# Patient Record
Sex: FEMALE | Race: WHITE | NOT HISPANIC OR LATINO | URBAN - METROPOLITAN AREA
[De-identification: names, ages, dates, MRNs, and addresses within clinical notes are randomized per-mention and may not be internally consistent; named-entity substitution may affect disease eponyms.]

---

## 2018-08-08 RX ORDER — NEBIVOLOL 2.5 MG/1
7.5 TABLET ORAL DAILY
Qty: 90 TABLET | Refills: 0 | Status: SHIPPED | OUTPATIENT
Start: 2018-08-08 | End: 2018-08-31

## 2018-08-08 RX ORDER — NEBIVOLOL 2.5 MG/1
3 TABLET ORAL DAILY
COMMUNITY
Start: 2017-07-12 | End: 2018-08-08 | Stop reason: SDUPTHER

## 2018-08-31 ENCOUNTER — OFFICE VISIT (OUTPATIENT)
Dept: CARDIOLOGY | Facility: CLINIC | Age: 53
End: 2018-08-31
Payer: COMMERCIAL

## 2018-08-31 VITALS
BODY MASS INDEX: 25.79 KG/M2 | HEIGHT: 61 IN | DIASTOLIC BLOOD PRESSURE: 80 MMHG | WEIGHT: 136.6 LBS | SYSTOLIC BLOOD PRESSURE: 110 MMHG | HEART RATE: 42 BPM | RESPIRATION RATE: 16 BRPM

## 2018-08-31 DIAGNOSIS — I10 ESSENTIAL HYPERTENSION: Primary | ICD-10-CM

## 2018-08-31 PROCEDURE — 99214 OFFICE O/P EST MOD 30 MIN: CPT | Performed by: INTERNAL MEDICINE

## 2018-08-31 PROCEDURE — 93000 ELECTROCARDIOGRAM COMPLETE: CPT | Performed by: INTERNAL MEDICINE

## 2018-08-31 RX ORDER — NEBIVOLOL 5 MG/1
5 TABLET ORAL DAILY
Qty: 30 TABLET | Refills: 5 | Status: SHIPPED | OUTPATIENT
Start: 2018-08-31 | End: 2019-03-26 | Stop reason: SDUPTHER

## 2018-10-16 ENCOUNTER — HOSPITAL ENCOUNTER (OUTPATIENT)
Dept: CARDIOLOGY | Facility: CLINIC | Age: 53
Discharge: HOME | End: 2018-10-16
Attending: INTERNAL MEDICINE
Payer: COMMERCIAL

## 2018-10-16 VITALS — SYSTOLIC BLOOD PRESSURE: 120 MMHG | HEART RATE: 85 BPM | DIASTOLIC BLOOD PRESSURE: 80 MMHG

## 2018-10-16 DIAGNOSIS — I10 ESSENTIAL HYPERTENSION: ICD-10-CM

## 2018-10-16 PROCEDURE — 93015 CV STRESS TEST SUPVJ I&R: CPT | Performed by: INTERNAL MEDICINE

## 2018-10-17 ENCOUNTER — TELEPHONE (OUTPATIENT)
Dept: CARDIOLOGY | Facility: CLINIC | Age: 53
End: 2018-10-17

## 2018-10-17 LAB
STRESS ANGINA INDEX: 0
STRESS BASELINE BP: NORMAL MMHG
STRESS BASELINE HR: 66 BPM
STRESS PERCENT HR: 93 %
STRESS POST ESTIMATED WORKLOAD: 10 METS
STRESS POST EXERCISE DUR MIN: 7 MIN
STRESS POST PEAK BP: NORMAL MMHG
STRESS POST PEAK HR: 156 BPM
STRESS TARGET HR: 142 BPM

## 2018-10-22 NOTE — TELEPHONE ENCOUNTER
I called and spoke with Tammy, she verbalized understanding her stress ekg was normal. She thanks you for the update!

## 2018-12-06 ENCOUNTER — DOCUMENTATION (OUTPATIENT)
Dept: CARDIOLOGY | Facility: CLINIC | Age: 53
End: 2018-12-06

## 2019-03-26 DIAGNOSIS — I10 ESSENTIAL HYPERTENSION: ICD-10-CM

## 2019-03-26 RX ORDER — NEBIVOLOL 5 MG/1
5 TABLET ORAL DAILY
Qty: 30 TABLET | Refills: 5 | Status: SHIPPED | OUTPATIENT
Start: 2019-03-26 | End: 2019-09-13 | Stop reason: SDUPTHER

## 2019-08-02 ENCOUNTER — TELEPHONE (OUTPATIENT)
Dept: SCHEDULING | Facility: CLINIC | Age: 54
End: 2019-08-02

## 2019-08-02 NOTE — TELEPHONE ENCOUNTER
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 Bystolic 5mg  bottle was lost. Do you have any samples to hold her until 8/26. This is when she can get it refilled. Please call patient at [phone] to advise if there are samples at [facility]. She does NOT have pills for tonight. ty

## 2019-08-26 ENCOUNTER — DOCUMENTATION (OUTPATIENT)
Dept: CARDIOLOGY | Facility: CLINIC | Age: 54
End: 2019-08-26

## 2019-09-11 ENCOUNTER — TELEPHONE (OUTPATIENT)
Dept: SCHEDULING | Facility: CLINIC | Age: 54
End: 2019-09-11

## 2019-09-11 NOTE — TELEPHONE ENCOUNTER
Pt states she was unable to make todays appt at 9:40 am, she was unable to make it on time.  Pt will rescheduled later, once she figures out her schedule.  Pt can be reached at [phone].

## 2019-09-13 ENCOUNTER — OFFICE VISIT (OUTPATIENT)
Dept: CARDIOLOGY | Facility: CLINIC | Age: 54
End: 2019-09-13
Payer: COMMERCIAL

## 2019-09-13 VITALS
DIASTOLIC BLOOD PRESSURE: 64 MMHG | BODY MASS INDEX: 27 KG/M2 | HEART RATE: 68 BPM | SYSTOLIC BLOOD PRESSURE: 112 MMHG | RESPIRATION RATE: 16 BRPM | WEIGHT: 143 LBS | HEIGHT: 61 IN

## 2019-09-13 DIAGNOSIS — I10 ESSENTIAL HYPERTENSION: Primary | ICD-10-CM

## 2019-09-13 PROCEDURE — 99213 OFFICE O/P EST LOW 20 MIN: CPT | Performed by: INTERNAL MEDICINE

## 2019-09-13 PROCEDURE — 93000 ELECTROCARDIOGRAM COMPLETE: CPT | Performed by: INTERNAL MEDICINE

## 2019-09-13 RX ORDER — NEBIVOLOL 5 MG/1
5 TABLET ORAL DAILY
Qty: 90 TABLET | Refills: 5 | Status: SHIPPED | OUTPATIENT
Start: 2019-09-13 | End: 2020-08-10 | Stop reason: SDUPTHER

## 2019-09-13 NOTE — ASSESSMENT & PLAN NOTE
Continue with nebivolol.  Prescription sent into her pharmacy.  Patient does not require any other cardiac testing at this time.

## 2019-09-13 NOTE — PROGRESS NOTES
Cardiology Outpatient Note    [facility] HEART [facility] Office  [address] [place] [address]     [facility]  The [facility] [address]  [address] [place] [address]     TEL  [phone]  [facility]     Tammy Walker is a 53 y.o. female with history of hypertension and tobacco use.  She says she smokes about 10 cigarettes daily.  She is on nebivolol 5 mg daily and says that she feels much better when she takes the medication.  She is very upset that she has gained weight and is concerned it is from the nebivolol.  She also blames the medication for her hair thinning.  When pressed further, she also admits to eating 1 pound of spaghetti yesterday and to not exercising.  Then she admits that those also may be contributing to the difficulty losing weight.  She has been on other medications in the past but did not tolerate them or feel well on them.  She denies any recent hospitalizations illnesses or procedures.  She denies any classic anginal chest pain, palpitations, syncope, or shortness of breath.  She works at the [employer].                                                         [facility]     Medical History:  Past Medical History:   Diagnosis Date   • Hypertension    • Tobacco abuse        Surgical History:  Past Surgical History:   Procedure Laterality Date   • BREAST RECONSTRUCTION  ,    implant   •  SECTION         Social History: reports that she has been smoking.  She has been smoking about 0.50 packs per day. She has never used smokeless tobacco. She reports that she drinks alcohol. Drug use questions deferred to the physician.    Family History: indicated that her biological mother is .       Allergies:No known allergies    Current Medications:    Outpatient Encounter Prescriptions as of 2019:   •  multivitamin with minerals (HAIR,SKIN AND NAILS) tablet, Take 1 tablet by mouth daily.  •  nebivolol  "(BYSTOLIC) 5 mg tablet, Take 1 tablet (5 mg total) by mouth daily.                                                          OBJECTIVE   ROS as in HPI   Constitution: Negative for chills and fever.   Eyes: Negative for blurred vision and visual disturbance.   Cardiovascular: Negative for chest pain, dyspnea on exertion, near-syncope, palpitations and syncope.   Respiratory: Negative for hemoptysis, shortness of breath and wheezing.    Hematologic/Lymphatic: Negative for bleeding problem.   Skin: Negative for rash. No new skin changes  Gastrointestinal: Negative for abdominal pain,  melena, nausea and vomiting.   Genitourinary: Negative for dysuria and hematuria.   Neurological: Negative for headaches.      Vitals:    09/13/19 1134   BP: 112/64   BP Location: Left upper arm   Patient Position: Sitting   Pulse: 68   Resp: 16   Weight: 64.9 kg (143 lb)   Height: 1.549 m (5' 1\")       BP Readings from Last 3 Encounters:   09/13/19 112/64   10/16/18 120/80   08/31/18 110/80     Wt Readings from Last 3 Encounters:   09/13/19 64.9 kg (143 lb)   08/31/18 62 kg (136 lb 9.6 oz)        Physical Exam   Constitutional: Appears comfortable in no distress  HEENT:  Neck Supple.  No JVD No carotid bruits no cervical lymphadenopathy  Head: Normocephalic.   Eyes: Extraocular movements intact  Cardiovascular: Normal rate, regular rhythm and normal heart sounds.  Exam reveals no friction rub.    Pulmonary/Chest: Effort normal and breath sounds normal. No wheezes.  Abdominal: Soft and nontender.   Musculoskeletal: No lower extremity edema.   Neurological: Alert and oriented to person, place, and time.   Skin: Skin is warm and dry.   Psychiatric: Is at her baseline-may be manic   Objective   No results found for: CHOL  No results found for: HDL  No results found for: LDLCALC  No results found for: TRIG  No results found for: ALT, AST  No results found for: WBC, HGB, HCT, PLT, INR  No results found for: GLUCOSE, NA, K, CO2, CL, BUN, " CREATININE  No results found for: HGBA1C  No results found for: TSH  No results found for: BNP    Troponin I Results     No lab values to display.                                                             IMAGING       Exercise treadmill stress test 10-16-18  Normal stress EKG.  No significant arrhythmias or ischemic changes.  Normal blood pressure response.          EKG 09/13/19  Sinus  Rhythm 68bpm QTC 382ms  WITHIN NORMAL LIMITS                                             ASSESSMENT AND PLAN   Ms. Estes is a 53-year-old female with a history of hypertension.  She continues to smoke 8-10 cigarettes daily.  Her issues include:        Assessment/Plan    No problem-specific Assessment & Plan notes found for this encounter.              No Follow-up on file.   Thank you for allowing me to participate in the care of this patient.  I hope this information is helpful.         Elen Diaz MD [facility]  9/13/2019  12:15 PM    This document was generated utilizing voice recognition technology. A reasonable attempt at proofreading has been made to minimize errors but please excuse any typographical errors which may be present. Please call with any questions.

## 2019-09-13 NOTE — LETTER
2019     Artemio Julio MD  [address]  [address]  [place] [address]    Patient: Tammy Walker  YOB: 1965  Date of Visit: 2019      Dear Dr. Julio:    Thank you for referring Tammy Walker to me for evaluation. Below are my notes for this consultation.    If you have questions, please do not hesitate to call me. I look forward to following your patient along with you.         Sincerely,        Elen Diaz MD        CC: No Recipients  Elen Diaz MD  2019  3:50 PM  Signed       Cardiology Outpatient Note    [facility] Office  [address] [place] [address]     [facility]  The [facility] Level  [address] [place] [address]     TEL  [phone]  [facility]/mlhc     Tammy Walker is a 53 y.o. female with history of hypertension and tobacco use.  She says she smokes about 10 cigarettes daily.  She is on nebivolol 5 mg daily and says that she feels much better when she takes the medication.  She is very upset that she has gained weight and is concerned it is from the nebivolol.  She also blames the medication for her hair thinning.  When pressed further, she also admits to eating 1 pound of spaghetti yesterday and to not exercising.  Then she admits that those also may be contributing to the difficulty losing weight.  She has been on other medications in the past but did not tolerate them or feel well on them.  She denies any recent hospitalizations illnesses or procedures.  She denies any classic anginal chest pain, palpitations, syncope, or shortness of breath.  She works at the post office.                                                         [facility]     Medical History:  Past Medical History:   Diagnosis Date   • Hypertension    • Tobacco abuse        Surgical History:  Past Surgical History:   Procedure Laterality Date   • BREAST RECONSTRUCTION  ,    implant   •  SECTION   "       Social History: reports that she has been smoking.  She has been smoking about 0.50 packs per day. She has never used smokeless tobacco. She reports that she drinks alcohol. Drug use questions deferred to the physician.    Family History: indicated that her biological mother is .       Allergies:No known allergies    Current Medications:    Outpatient Encounter Prescriptions as of 2019:   •  multivitamin with minerals (HAIR,SKIN AND NAILS) tablet, Take 1 tablet by mouth daily.  •  nebivolol (BYSTOLIC) 5 mg tablet, Take 1 tablet (5 mg total) by mouth daily.                                                          OBJECTIVE   ROS as in HPI   Constitution: Negative for chills and fever.   Eyes: Negative for blurred vision and visual disturbance.   Cardiovascular: Negative for chest pain, dyspnea on exertion, near-syncope, palpitations and syncope.   Respiratory: Negative for hemoptysis, shortness of breath and wheezing.    Hematologic/Lymphatic: Negative for bleeding problem.   Skin: Negative for rash. No new skin changes  Gastrointestinal: Negative for abdominal pain,  melena, nausea and vomiting.   Genitourinary: Negative for dysuria and hematuria.   Neurological: Negative for headaches.      Vitals:    19 1134   BP: 112/64   BP Location: Left upper arm   Patient Position: Sitting   Pulse: 68   Resp: 16   Weight: 64.9 kg (143 lb)   Height: 1.549 m (5' 1\")       BP Readings from Last 3 Encounters:   19 112/64   10/16/18 120/80   18 110/80     Wt Readings from Last 3 Encounters:   19 64.9 kg (143 lb)   18 62 kg (136 lb 9.6 oz)        Physical Exam   Constitutional: Appears comfortable in no distress  HEENT:  Neck Supple.  No JVD No carotid bruits no cervical lymphadenopathy  Head: Normocephalic.   Eyes: Extraocular movements intact  Cardiovascular: Normal rate, regular rhythm and normal heart sounds.  Exam reveals no friction rub.    Pulmonary/Chest: Effort normal and " breath sounds normal. No wheezes.  Abdominal: Soft and nontender.   Musculoskeletal: No lower extremity edema.   Neurological: Alert and oriented to person, place, and time.   Skin: Skin is warm and dry.   Psychiatric: Is at her baseline-may be manic   Objective   No results found for: CHOL  No results found for: HDL  No results found for: LDLCALC  No results found for: TRIG  No results found for: ALT, AST  No results found for: WBC, HGB, HCT, PLT, INR  No results found for: GLUCOSE, NA, K, CO2, CL, BUN, CREATININE  No results found for: HGBA1C  No results found for: TSH  No results found for: BNP    Troponin I Results     No lab values to display.                                                             IMAGING       Exercise treadmill stress test 10-16-18  Normal stress EKG.  No significant arrhythmias or ischemic changes.  Normal blood pressure response.          EKG 09/13/19  Sinus  Rhythm 68bpm QTC 382ms  WITHIN NORMAL LIMITS                                             ASSESSMENT AND PLAN   Ms. Estes is a 53-year-old female with a history of hypertension.  She continues to smoke 8-10 cigarettes daily.  Her issues include:        Assessment/Plan    No problem-specific Assessment & Plan notes found for this encounter.              No Follow-up on file.   Thank you for allowing me to participate in the care of this patient.  I hope this information is helpful.         Elen Diaz MD [facility]  9/13/2019  12:15 PM    This document was generated utilizing voice recognition technology. A reasonable attempt at proofreading has been made to minimize errors but please excuse any typographical errors which may be present. Please call with any questions.

## 2020-02-10 ENCOUNTER — TELEPHONE (OUTPATIENT)
Dept: SCHEDULING | Facility: CLINIC | Age: 55
End: 2020-02-10

## 2020-02-10 NOTE — TELEPHONE ENCOUNTER
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 She is at work and does not have her bystolic 5mg medication with her at work. Do you have samples at [facility]? Please call patient at [phone]

## 2020-08-10 DIAGNOSIS — I10 ESSENTIAL HYPERTENSION: ICD-10-CM

## 2020-08-10 RX ORDER — NEBIVOLOL 5 MG/1
5 TABLET ORAL DAILY
Qty: 30 TABLET | Refills: 0 | Status: SHIPPED | OUTPATIENT
Start: 2020-08-10 | End: 2020-12-08

## 2020-09-11 ENCOUNTER — OFFICE VISIT (OUTPATIENT)
Dept: CARDIOLOGY | Facility: CLINIC | Age: 55
End: 2020-09-11
Payer: COMMERCIAL

## 2020-09-11 VITALS
SYSTOLIC BLOOD PRESSURE: 116 MMHG | HEART RATE: 76 BPM | BODY MASS INDEX: 23.03 KG/M2 | DIASTOLIC BLOOD PRESSURE: 80 MMHG | HEIGHT: 61 IN | RESPIRATION RATE: 16 BRPM | WEIGHT: 122 LBS

## 2020-09-11 DIAGNOSIS — I10 ESSENTIAL HYPERTENSION: Primary | ICD-10-CM

## 2020-09-11 PROCEDURE — 93000 ELECTROCARDIOGRAM COMPLETE: CPT | Performed by: INTERNAL MEDICINE

## 2020-09-11 PROCEDURE — 99213 OFFICE O/P EST LOW 20 MIN: CPT | Performed by: INTERNAL MEDICINE

## 2020-09-11 NOTE — LETTER
2020     Artemio Julio MD  [address]  [address]  [facility] [place] [facility] [address]    Patient: Tammy Walker  YOB: 1965  Date of Visit: 2020      Dear Dr. Julio:    Thank you for referring Tammy Walker to me for evaluation. Below are my notes for this consultation.    If you have questions, please do not hesitate to call me. I look forward to following your patient along with you.         Sincerely,        Elen Diaz MD        CC: No Recipients  Elen Diaz MD  2020  4:14 PM  Signed       Cardiology Outpatient Note    [facility] Office  [address] [place] [address]     [facility]  The Heart [facility] Level  [address] [place] [address]     TEL  [phone]  [facility]/mlhc     Tammy Walker is a 54 y.o. female with history of hypertension and tobacco use.  She occasionally smokes cigarettes and attributes much of this to stress at her work.  She works at the [employer].    In the last few months she has developed some tooth pain and dental aches.  She was given antibiotics and the pain improved.  She says she was told that she was grinding her teeth and may require some additional dental work.  However in this process she was eating less and has lost about 20 pounds since her last visit.    She denies any chest pain, palpitations, syncope, or shortness of breath.  She denies any major illnesses since her last visit.  She mostly is concerned about the stress at her work.                                                         [facility]     Medical History:  Past Medical History:   Diagnosis Date   • Hypertension    • Tobacco abuse        Surgical History:  Past Surgical History:   Procedure Laterality Date   • BREAST RECONSTRUCTION  ,    implant   •  SECTION         Social History: reports that she has been smoking. She has been smoking about 0.50 packs per day. She has never used  "smokeless tobacco. She reports that she drinks alcohol. Drug use questions deferred to the physician.    Family History: She indicated that her biological mother is .      Allergies:No known allergies    Current Medications:    Outpatient Encounter Medications as of 2020:   •  multivitamin tablet, Take 1 tablet by mouth daily.  •  nebivoloL (BYSTOLIC) 5 mg tablet, Take 1 tablet (5 mg total) by mouth daily.  •  multivitamin with minerals (HAIR,SKIN AND NAILS) tablet, Take 1 tablet by mouth daily.                                                          OBJECTIVE   ROS as in HPI   Constitution: Negative for chills and fever.   Eyes: Negative for blurred vision and visual disturbance.   Cardiovascular: Negative for chest pain, dyspnea on exertion, near-syncope, palpitations and syncope.   Respiratory: Negative for hemoptysis, shortness of breath and wheezing.    Hematologic/Lymphatic: Negative for bleeding problem.   Skin: Negative for rash. No new skin changes  Gastrointestinal: Negative for abdominal pain, diarrhea, hematochezia, melena, nausea and vomiting.   Genitourinary: Negative for dysuria and hematuria.   Neurological: Negative for headaches.   Positive stress         Vitals:    20 1456   BP: 116/80   BP Location: Left upper arm   Patient Position: Sitting   Pulse: 76   Resp: 16   Weight: 55.3 kg (122 lb)   Height: 1.549 m (5' 1\")       BP Readings from Last 3 Encounters:   20 116/80   19 112/64   10/16/18 120/80     Wt Readings from Last 3 Encounters:   20 55.3 kg (122 lb)   19 64.9 kg (143 lb)   18 62 kg (136 lb 9.6 oz)       Physical Exam   Constitutional: Appears comfortable in no distress  HEENT:  Neck Supple.  No JVD No carotid bruits no cervical lymphadenopathy  Head: Normocephalic.   Eyes: Extraocular movements intact  Cardiovascular: Normal rate, regular rhythm and normal heart sounds.  Exam reveals no friction rub.    Pulmonary/Chest: Effort normal and " breath sounds normal. No wheezes.  Abdominal: Soft and nontender.  Musculoskeletal: No lower extremity edema.   Neurological: Alert and oriented to person, place, and time.   Skin: Skin is warm and dry.   Psychiatric: Behavior is normal.            Objective   No results found for: CHOL  No results found for: HDL  No results found for: LDLCALC  No results found for: TRIG  No results found for: ALT, AST  No results found for: WBC, HGB, HCT, PLT, INR  No results found for: GLUCOSE, NA, K, CO2, CL, BUN, CREATININE  No results found for: HGBA1C  No results found for: TSH  No results found for: BNP  [unfilled]    Troponin I Results     No lab values to display.                                                             IMAGING         Exercise treadmill stress test 10-16-18  Normal stress EKG.  No significant arrhythmias or ischemic changes.  Normal blood pressure response.             EKG 9/11/2020   Sinus  Rhythm 76bpm QTC 400ms                                           ASSESSMENT AND PLAN     Ms. Estes is a 54-year-old woman with the following issues:  Assessment/Plan    Essential hypertension  Continue with nebivolol 5 mg daily.  Her blood pressures currently controlled.  If the patient loses more weight or finds her blood pressure low she checks it herself, she has been asked to call our office to adjust her dose of medication.              Return in about 1 year (around 9/11/2021).       Thank you for allowing me to participate in the care of this patient.  I hope this information is helpful.         Elen Diaz MD [facility]  9/11/2020  4:14 PM    This document was generated utilizing voice recognition technology. A reasonable attempt at proofreading has been made to minimize errors but please excuse any typographical errors which may be present. Please call with any questions.

## 2020-09-11 NOTE — ASSESSMENT & PLAN NOTE
Continue with nebivolol 5 mg daily.  Her blood pressures currently controlled.  If the patient loses more weight or finds her blood pressure low she checks it herself, she has been asked to call our office to adjust her dose of medication.

## 2020-09-11 NOTE — PROGRESS NOTES
Cardiology Outpatient Note    [facility] HEART GROUP    [facility] Office  [address] [place] [address]     [facility]  [facility] [address]  [address] [place] [address]     TEL  [phone]  [facility]/mlhc     Tammy Walker is a 54 y.o. female with history of hypertension and tobacco use.  She occasionally smokes cigarettes and attributes much of this to stress at her work.  She works at the [employer] office.    In the last few months she has developed some tooth pain and dental aches.  She was given antibiotics and the pain improved.  She says she was told that she was grinding her teeth and may require some additional dental work.  However in this process she was eating less and has lost about 20 pounds since her last visit.    She denies any chest pain, palpitations, syncope, or shortness of breath.  She denies any major illnesses since her last visit.  She mostly is concerned about the stress at her work.                                                         [facility]     Medical History:  Past Medical History:   Diagnosis Date   • Hypertension    • Tobacco abuse        Surgical History:  Past Surgical History:   Procedure Laterality Date   • BREAST RECONSTRUCTION  ,    implant   •  SECTION         Social History: reports that she has been smoking. She has been smoking about 0.50 packs per day. She has never used smokeless tobacco. She reports that she drinks alcohol. Drug use questions deferred to the physician.    Family History: She indicated that her biological mother is .      Allergies:No known allergies    Current Medications:    Outpatient Encounter Medications as of 2020:   •  multivitamin tablet, Take 1 tablet by mouth daily.  •  nebivoloL (BYSTOLIC) 5 mg tablet, Take 1 tablet (5 mg total) by mouth daily.  •  multivitamin with minerals (HAIR,SKIN AND NAILS) tablet, Take 1 tablet by mouth daily.                   "                                        OBJECTIVE   ROS as in HPI   Constitution: Negative for chills and fever.   Eyes: Negative for blurred vision and visual disturbance.   Cardiovascular: Negative for chest pain, dyspnea on exertion, near-syncope, palpitations and syncope.   Respiratory: Negative for hemoptysis, shortness of breath and wheezing.    Hematologic/Lymphatic: Negative for bleeding problem.   Skin: Negative for rash. No new skin changes  Gastrointestinal: Negative for abdominal pain, diarrhea, hematochezia, melena, nausea and vomiting.   Genitourinary: Negative for dysuria and hematuria.   Neurological: Negative for headaches.   Positive stress         Vitals:    09/11/20 1456   BP: 116/80   BP Location: Left upper arm   Patient Position: Sitting   Pulse: 76   Resp: 16   Weight: 55.3 kg (122 lb)   Height: 1.549 m (5' 1\")       BP Readings from Last 3 Encounters:   09/11/20 116/80   09/13/19 112/64   10/16/18 120/80     Wt Readings from Last 3 Encounters:   09/11/20 55.3 kg (122 lb)   09/13/19 64.9 kg (143 lb)   08/31/18 62 kg (136 lb 9.6 oz)       Physical Exam   Constitutional: Appears comfortable in no distress  HEENT:  Neck Supple.  No JVD No carotid bruits no cervical lymphadenopathy  Head: Normocephalic.   Eyes: Extraocular movements intact  Cardiovascular: Normal rate, regular rhythm and normal heart sounds.  Exam reveals no friction rub.    Pulmonary/Chest: Effort normal and breath sounds normal. No wheezes.  Abdominal: Soft and nontender.  Musculoskeletal: No lower extremity edema.   Neurological: Alert and oriented to person, place, and time.   Skin: Skin is warm and dry.   Psychiatric: Behavior is normal.            Objective   No results found for: CHOL  No results found for: HDL  No results found for: LDLCALC  No results found for: TRIG  No results found for: ALT, AST  No results found for: WBC, HGB, HCT, PLT, INR  No results found for: GLUCOSE, NA, K, CO2, CL, BUN, CREATININE  No results " found for: HGBA1C  No results found for: TSH  No results found for: BNP  [unfilled]    Troponin I Results     No lab values to display.                                                             IMAGING         Exercise treadmill stress test 10-16-18  Normal stress EKG.  No significant arrhythmias or ischemic changes.  Normal blood pressure response.             EKG 9/11/2020   Sinus  Rhythm 76bpm QTC 400ms                                           ASSESSMENT AND PLAN     Ms. Estes is a 54-year-old woman with the following issues:  Assessment/Plan    Essential hypertension  Continue with nebivolol 5 mg daily.  Her blood pressures currently controlled.  If the patient loses more weight or finds her blood pressure low she checks it herself, she has been asked to call our office to adjust her dose of medication.              Return in about 1 year (around 9/11/2021).       Thank you for allowing me to participate in the care of this patient.  I hope this information is helpful.         Elen Diaz MD [facility]  9/11/2020  4:14 PM    This document was generated utilizing voice recognition technology. A reasonable attempt at proofreading has been made to minimize errors but please excuse any typographical errors which may be present. Please call with any questions.

## 2020-12-11 ENCOUNTER — TELEPHONE (OUTPATIENT)
Dept: SCHEDULING | Facility: CLINIC | Age: 55
End: 2020-12-11

## 2020-12-11 NOTE — TELEPHONE ENCOUNTER
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 Please call her at [phone] to advise that she can pick one up today at [facility] TODAY.

## 2021-10-11 ENCOUNTER — TELEPHONE (OUTPATIENT)
Dept: SCHEDULING | Facility: CLINIC | Age: 56
End: 2021-10-11

## 2021-10-11 NOTE — TELEPHONE ENCOUNTER
Pt calling to schedule a follow up appt with Dr Diaz.  No appts avail.  Pt can be reached at [phone].

## 2021-10-27 DIAGNOSIS — I10 ESSENTIAL HYPERTENSION: ICD-10-CM

## 2021-10-27 RX ORDER — NEBIVOLOL 5 MG/1
5 TABLET ORAL
Qty: 90 TABLET | Refills: 1 | Status: SHIPPED | OUTPATIENT
Start: 2021-10-27 | End: 2022-05-16

## 2021-10-29 RX ORDER — NEBIVOLOL 5 MG/1
5 TABLET ORAL
Qty: 90 TABLET | Refills: 1 | Status: CANCELLED | OUTPATIENT
Start: 2021-10-29

## 2021-10-29 NOTE — TELEPHONE ENCOUNTER
Medicine Refill Request    Last Office Visit: 9/11/2020  Last Telemedicine Visit: Visit date not found    Next Office Visit: 11/3/2021  Next Telemedicine Visit: Visit date not found     Pt calling to request medication refill.   Pt states she uses the Discount Coupon Card for [facility] for 90 Day Supply. TY     Current Outpatient Medications:   •  multivitamin tablet, Take 1 tablet by mouth daily., Disp: , Rfl:   •  multivitamin with minerals (HAIR,SKIN AND NAILS) tablet, Take 1 tablet by mouth daily., Disp: , Rfl:   •  nebivoloL (BYSTOLIC) 5 mg tablet, Take 1 tablet (5 mg total) by mouth once daily., Disp: 90 tablet, Rfl: 1      BP Readings from Last 3 Encounters:   09/11/20 116/80   09/13/19 112/64   10/16/18 120/80       Recent Lab results:  No results found for: CHOL, No results found for: HDL, No results found for: LDLCALC, No results found for: TRIG     No results found for: GLUCOSE, No results found for: HGBA1C      No results found for: CREATININE    No results found for: TSH

## 2021-11-03 ENCOUNTER — OFFICE VISIT (OUTPATIENT)
Dept: CARDIOLOGY | Facility: CLINIC | Age: 56
End: 2021-11-03
Payer: COMMERCIAL

## 2021-11-03 VITALS
BODY MASS INDEX: 25.79 KG/M2 | SYSTOLIC BLOOD PRESSURE: 122 MMHG | RESPIRATION RATE: 16 BRPM | DIASTOLIC BLOOD PRESSURE: 84 MMHG | HEIGHT: 61 IN | HEART RATE: 61 BPM | WEIGHT: 136.6 LBS

## 2021-11-03 DIAGNOSIS — I10 ESSENTIAL HYPERTENSION: Primary | ICD-10-CM

## 2021-11-03 PROBLEM — E78.00 HYPERCHOLESTEROLEMIA: Status: ACTIVE | Noted: 2021-11-03

## 2021-11-03 PROCEDURE — 93000 ELECTROCARDIOGRAM COMPLETE: CPT | Performed by: INTERNAL MEDICINE

## 2021-11-03 PROCEDURE — 99214 OFFICE O/P EST MOD 30 MIN: CPT | Performed by: INTERNAL MEDICINE

## 2021-11-03 PROCEDURE — 3008F BODY MASS INDEX DOCD: CPT | Performed by: INTERNAL MEDICINE

## 2021-11-03 RX ORDER — ATORVASTATIN CALCIUM 10 MG/1
1 TABLET, FILM COATED ORAL DAILY
COMMUNITY
Start: 2021-10-22 | End: 2022-01-12

## 2021-11-03 NOTE — LETTER
November 3, 2021     Artemio Julio MD  [address]  [address]  [facility] [place] [facility] [address]    Patient: Tammy Walker  YOB: 1965  Date of Visit: 11/3/2021      Dear Dr. Julio:    Thank you for referring Tammy Walker to me for evaluation. Below are my notes for this consultation.    If you have questions, please do not hesitate to call me. I look forward to following your patient along with you.         Sincerely,        Elen Diaz MD        CC: No Recipients  Elen Diaz MD  11/3/2021  9:46 AM  Signed       Cardiology Outpatient Note    [facility] HEART [facility] Office  [address] [place] [address]     [facility]  The [facility] Level  [address] [place] [address]     TEL  [phone]  [facility]     Tammy Walker is a 55 y.o. female with history of hypertension and tobacco use.  She occasionally smokes cigarettes and attributes much of this to stress at her work.  She works at the [employer].    She had a recent mammogram which was unremarkable.  She has routine gynecologic exam scheduled.  She had blood work 9-2021 which was notable for an LDL of 164 mg/dL which is higher than her normal values.  Her triglycerides were 181 and liver function tests were normal.  In that setting her primary care physician started her on atorvastatin 10 mg daily.  Blood work 10- showed  ALT 47 with  and triglycerides 285.   On blood work 10-19-21 patient's liver function tests were normal   CBC, TSH, and creatinine were normal.    She denies any chest pain, palpitations, syncope, or shortness of breath.  She denies any major illnesses since her last visit.  She mostly is concerned about the stress at her work and her blood work.    She denies any acute illnesses or hospitalizations.  She continues to smoke.  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                                    "[facility]     Medical History:  Past Medical History:   Diagnosis Date   • Hypertension    • Tobacco abuse        Surgical History:  Past Surgical History:   Procedure Laterality Date   • BREAST RECONSTRUCTION  ,    implant   •  SECTION         Social History: reports that she has been smoking. She has been smoking about 0.50 packs per day. She has never used smokeless tobacco. She reports current alcohol use. Drug use questions deferred to the physician.    Family History: She indicated that her biological mother is .      Allergies:No known allergies    Current Medications:    Outpatient Encounter Medications as of 11/3/2021:   •  atorvastatin 10 mg tablet, Take 1 tablet by mouth daily.  •  multivitamin tablet, Take 1 tablet by mouth daily.  •  multivitamin with minerals (HAIR,SKIN AND NAILS) tablet, Take 1 tablet by mouth daily.  •  nebivoloL (BYSTOLIC) 5 mg tablet, Take 1 tablet (5 mg total) by mouth once daily.  •  [DISCONTINUED] BYSTOLIC 5 mg tablet, TAKE ONE TABLET BY MOUTH EVERY DAY                                                          OBJECTIVE   ROS as in HPI   Constitution: Negative for chills and fever.   Eyes: Negative for blurred vision and visual disturbance.   Cardiovascular: Negative for chest pain, dyspnea on exertion, near-syncope, palpitations and syncope.   Respiratory: Negative for hemoptysis, shortness of breath and wheezing.    Hematologic/Lymphatic: Negative for bleeding problem.   Skin: Negative for rash. No new skin changes  Gastrointestinal: Negative for abdominal pain, diarrhea, hematochezia, melena, nausea and vomiting.   Genitourinary: Negative for dysuria and hematuria.   Neurological: Negative for headaches.   Positive stress         Vitals:    21 0909   BP: 122/84   BP Location: Left upper arm   Patient Position: Sitting   Pulse: 61   Resp: 16   Weight: 62 kg (136 lb 9.6 oz)   Height: 1.549 m (5' 1\")       BP Readings from Last 3 " Encounters:   11/03/21 122/84   09/11/20 116/80   09/13/19 112/64     Wt Readings from Last 3 Encounters:   11/03/21 62 kg (136 lb 9.6 oz)   09/11/20 55.3 kg (122 lb)   09/13/19 64.9 kg (143 lb)       Physical Exam   Constitutional: Appears comfortable in no distress  HEENT:  Neck Supple.  No JVD No carotid bruits no cervical lymphadenopathy  Head: Normocephalic.   Eyes: Extraocular movements intact  Cardiovascular: Normal rate, regular rhythm and normal heart sounds.  Exam reveals no friction rub.    Pulmonary/Chest: Effort normal and breath sounds normal. No wheezes.  Abdominal: Soft and nontender.  Musculoskeletal: No lower extremity edema.   Neurological: Alert and oriented to person, place, and time.   Skin: Skin is warm and dry.   Psychiatric: Behavior is normal.            Objective   No results found for: CHOL  No results found for: HDL  No results found for: LDLCALC  No results found for: TRIG  No results found for: ALT, AST  No results found for: WBC, HGB, HCT, PLT, INR  No results found for: GLUCOSE, NA, K, CO2, CL, BUN, CREATININE  No results found for: HGBA1C  No results found for: TSH  No results found for: BNP  [unfilled]    Troponin I Results     No lab values to display.                                                             IMAGING     Exercise treadmill stress test 10-16-18  Normal stress EKG.  No significant arrhythmias or ischemic changes.  Normal blood pressure response.     EKG 11/3/2021   Sinus  Rhythm 61bpm [id number]  WITHIN NORMAL LIMITS                                             ASSESSMENT AND PLAN     Ms. Estes is a 55-year-old woman with the following issues:  Assessment/Plan    Essential hypertension  Nebivolol 5 mg daily    Hypercholesterolemia  On atorvastatin 10 mg daily.  She has taken it for a week and is tolerating the medication well.  The liver function tests initially reported 10-11-21 which then normalized on blood work 10-19-21 without any intervention may be an  aberration.  Calculated 10-year risk of cardiovascular disease per ACC/AHA guidelines is borderline at 7.3% even with elevated LDL.  However her triglyceride level is elevated and she is a smoker.  She was recommended to continue with the medication.  She will return in approximately 2 to 3 months and have an echocardiogram at that time along with repeat blood work to check her lipid profile liver function test.              Return in about 3 months (around 2/3/2022).       Thank you for allowing me to participate in the care of this patient.  I hope this information is helpful.         Elen Diaz MD [facility] VLADISLAV  11/3/2021  9:46 AM    This document was generated utilizing voice recognition technology. A reasonable attempt at proofreading has been made to minimize errors but please excuse any typographical errors which may be present. Please call with any questions.

## 2021-11-03 NOTE — PROGRESS NOTES
Cardiology Outpatient Note    [facility] HEART [facility] Office  [address] [place] [address]     [facility]  The [facility] [address] [facility]  [address] [place] [address]     TEL  [phone]  [facility]/mlhc     Tammy Walker is a 55 y.o. female with history of hypertension and tobacco use.  She occasionally smokes cigarettes and attributes much of this to stress at her work.  She works at the [employer].    She had a recent mammogram which was unremarkable.  She has routine gynecologic exam scheduled.  She had blood work  which was notable for an LDL of 164 mg/dL which is higher than her normal values.  Her triglycerides were 181 and liver function tests were normal.  In that setting her primary care physician started her on atorvastatin 10 mg daily.  Blood work 10- showed  ALT 47 with  and triglycerides 285.   On blood work 10-19-21 patient's liver function tests were normal   CBC, TSH, and creatinine were normal.    She denies any chest pain, palpitations, syncope, or shortness of breath.  She denies any major illnesses since her last visit.  She mostly is concerned about the stress at her work and her blood work.    She denies any acute illnesses or hospitalizations.  She continues to smoke.  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                                                         [facility]     Medical History:  Past Medical History:   Diagnosis Date   • Hypertension    • Tobacco abuse        Surgical History:  Past Surgical History:   Procedure Laterality Date   • BREAST RECONSTRUCTION  ,    implant   •  SECTION         Social History: reports that she has been smoking. She has been smoking about 0.50 packs per day. She has never used smokeless tobacco. She reports current alcohol use. Drug use questions deferred to the physician.    Family History: She  "indicated that her biological mother is .      Allergies:No known allergies    Current Medications:    Outpatient Encounter Medications as of 11/3/2021:   •  atorvastatin 10 mg tablet, Take 1 tablet by mouth daily.  •  multivitamin tablet, Take 1 tablet by mouth daily.  •  multivitamin with minerals (HAIR,SKIN AND NAILS) tablet, Take 1 tablet by mouth daily.  •  nebivoloL (BYSTOLIC) 5 mg tablet, Take 1 tablet (5 mg total) by mouth once daily.  •  [DISCONTINUED] BYSTOLIC 5 mg tablet, TAKE ONE TABLET BY MOUTH EVERY DAY                                                          OBJECTIVE   ROS as in HPI   Constitution: Negative for chills and fever.   Eyes: Negative for blurred vision and visual disturbance.   Cardiovascular: Negative for chest pain, dyspnea on exertion, near-syncope, palpitations and syncope.   Respiratory: Negative for hemoptysis, shortness of breath and wheezing.    Hematologic/Lymphatic: Negative for bleeding problem.   Skin: Negative for rash. No new skin changes  Gastrointestinal: Negative for abdominal pain, diarrhea, hematochezia, melena, nausea and vomiting.   Genitourinary: Negative for dysuria and hematuria.   Neurological: Negative for headaches.   Positive stress         Vitals:    21 0909   BP: 122/84   BP Location: Left upper arm   Patient Position: Sitting   Pulse: 61   Resp: 16   Weight: 62 kg (136 lb 9.6 oz)   Height: 1.549 m (5' 1\")       BP Readings from Last 3 Encounters:   21 122/84   20 116/80   19 112/64     Wt Readings from Last 3 Encounters:   21 62 kg (136 lb 9.6 oz)   20 55.3 kg (122 lb)   19 64.9 kg (143 lb)       Physical Exam   Constitutional: Appears comfortable in no distress  HEENT:  Neck Supple.  No JVD No carotid bruits no cervical lymphadenopathy  Head: Normocephalic.   Eyes: Extraocular movements intact  Cardiovascular: Normal rate, regular rhythm and normal heart sounds.  Exam reveals no friction rub.  "   Pulmonary/Chest: Effort normal and breath sounds normal. No wheezes.  Abdominal: Soft and nontender.  Musculoskeletal: No lower extremity edema.   Neurological: Alert and oriented to person, place, and time.   Skin: Skin is warm and dry.   Psychiatric: Behavior is normal.            Objective   No results found for: CHOL  No results found for: HDL  No results found for: LDLCALC  No results found for: TRIG  No results found for: ALT, AST  No results found for: WBC, HGB, HCT, PLT, INR  No results found for: GLUCOSE, NA, K, CO2, CL, BUN, CREATININE  No results found for: HGBA1C  No results found for: TSH  No results found for: BNP  [unfilled]    Troponin I Results     No lab values to display.                                                             IMAGING     Exercise treadmill stress test 10-16-18  Normal stress EKG.  No significant arrhythmias or ischemic changes.  Normal blood pressure response.     EKG 11/3/2021   Sinus  Rhythm 61bpm [id number]  WITHIN NORMAL LIMITS                                             ASSESSMENT AND PLAN     Ms. Estes is a 55-year-old woman with the following issues:  Assessment/Plan    Essential hypertension  Nebivolol 5 mg daily    Hypercholesterolemia  On atorvastatin 10 mg daily.  She has taken it for a week and is tolerating the medication well.  The liver function tests initially reported 10-11-21 which then normalized on blood work 10-19-21 without any intervention may be an aberration.  Calculated 10-year risk of cardiovascular disease per ACC/AHA guidelines is borderline at 7.3% even with elevated LDL.  However her triglyceride level is elevated and she is a smoker.  She was recommended to continue with the medication.  She will return in approximately 2 to 3 months and have an echocardiogram at that time along with repeat blood work to check her lipid profile liver function test.              Return in about 3 months (around 2/3/2022).       Thank you for allowing me to  participate in the care of this patient.  I hope this information is helpful.         Elen Diaz MD [facility]  11/3/2021  9:46 AM    This document was generated utilizing voice recognition technology. A reasonable attempt at proofreading has been made to minimize errors but please excuse any typographical errors which may be present. Please call with any questions.

## 2021-11-03 NOTE — ASSESSMENT & PLAN NOTE
On atorvastatin 10 mg daily.  She has taken it for a week and is tolerating the medication well.  The liver function tests initially reported 10-11-21 which then normalized on blood work 10-19-21 without any intervention may be an aberration.  Calculated 10-year risk of cardiovascular disease per ACC/AHA guidelines is borderline at 7.3% even with elevated LDL.  However her triglyceride level is elevated and she is a smoker.  She was recommended to continue with the medication.  She will return in approximately 2 to 3 months and have an echocardiogram at that time along with repeat blood work to check her lipid profile liver function test.

## 2022-01-12 ENCOUNTER — HOSPITAL ENCOUNTER (OUTPATIENT)
Dept: CARDIOLOGY | Facility: CLINIC | Age: 57
Discharge: HOME | End: 2022-01-12
Attending: INTERNAL MEDICINE
Payer: COMMERCIAL

## 2022-01-12 ENCOUNTER — OFFICE VISIT (OUTPATIENT)
Dept: CARDIOLOGY | Facility: CLINIC | Age: 57
End: 2022-01-12
Payer: COMMERCIAL

## 2022-01-12 VITALS
HEART RATE: 61 BPM | RESPIRATION RATE: 16 BRPM | DIASTOLIC BLOOD PRESSURE: 84 MMHG | HEIGHT: 61 IN | SYSTOLIC BLOOD PRESSURE: 116 MMHG | WEIGHT: 141 LBS | BODY MASS INDEX: 26.62 KG/M2

## 2022-01-12 VITALS
HEIGHT: 61 IN | DIASTOLIC BLOOD PRESSURE: 78 MMHG | BODY MASS INDEX: 26.62 KG/M2 | WEIGHT: 141 LBS | SYSTOLIC BLOOD PRESSURE: 120 MMHG

## 2022-01-12 DIAGNOSIS — I10 ESSENTIAL HYPERTENSION: Primary | ICD-10-CM

## 2022-01-12 DIAGNOSIS — I10 ESSENTIAL HYPERTENSION: ICD-10-CM

## 2022-01-12 PROBLEM — Z92.29 COVID-19 VACCINE SERIES COMPLETED: Status: ACTIVE | Noted: 2022-01-12

## 2022-01-12 LAB
AORTIC ROOT ANNULUS - M-MODE: 2.5 CM
AORTIC ROOT ANNULUS: 2.9 CM
ASCENDING AORTA: 2.9 CM
AV PEAK GRADIENT: 9 MMHG
BSA FOR ECHO PROCEDURE: 1.66 M2
DOP CALC LVOT STROKE VOLUME: 56.39 ML
E WAVE DECELERATION TIME: 254 MS
E/A RATIO: 0.7
E/E' RATIO: 8.5
E/LAT E' RATIO: 9.4
EDV (BP): 29.3 CM3
EJECTION FRACTION: 45.1 %
EST RIGHT VENT SYSTOLIC PRESSURE BY TRICUSPID REGURGITATION JET: 15 MMHG
ESV (BP): 16.1 CM3
FRACTIONAL SHORTENING: 38.6 %
INTERVENTRICULAR SEPTUM: 0.95 CM
LA ESV (BP): 35.5 CM3
LA ESV INDEX (A2C): 23.55 CM3/M2
LA ESV INDEX (BP): 21.39 CM3/M2
LA/AORTA RATIO: 1.36
LAAS-AP2: 15.3 CM2
LAAS-AP4: 13.7 CM2
LAD 2D - M-MODE: 3.4 CM
LAD 2D - M-MODE: 3.4 CM
LAD 2D: 3.3 CM
LALD A4C: 4.74 CM
LALD A4C: 4.81 CM
LAV-S: 39.1 CM3
LEFT ATRIUM VOLUME INDEX: 19.34 CM3/M2
LEFT ATRIUM VOLUME: 32.1 CM3
LEFT INTERNAL DIMENSION IN SYSTOLE: 2.24 CM (ref 2.32–3.51)
LEFT VENTRICLE DIASTOLIC VOLUME INDEX: 20.72 CM3/M2
LEFT VENTRICLE DIASTOLIC VOLUME: 34.4 CM3
LEFT VENTRICLE SYSTOLIC VOLUME INDEX: 11.93 CM3/M2
LEFT VENTRICLE SYSTOLIC VOLUME: 19.8 CM3
LEFT VENTRICULAR INTERNAL DIMENSION IN DIASTOLE: 3.65 CM (ref 3.9–5.42)
LEFT VENTRICULAR POSTERIOR WALL IN END DIASTOLE: 0.97 CM (ref 0.51–0.94)
LV DIASTOLIC VOLUME: 23.9 CM3
LV ESV (APICAL 2 CHAMBER): 10.8 CM3
LVAD-AP2: 13.5 CM2
LVAD-AP4: 17.2 CM2
LVAS-AP2: 7.73 CM2
LVAS-AP4: 11.8 CM2
LVEDVI(A2C): 14.4 CM3/M2
LVEDVI(BP): 17.65 CM3/M2
LVESVI(A2C): 6.51 CM3/M2
LVESVI(BP): 9.7 CM3/M2
LVLD-AP2: 6.78 CM
LVLD-AP4: 7.29 CM
LVLS-AP2: 5.06 CM
LVLS-AP4: 6.24 CM
LVOT 2D: 1.9 CM
LVOT A: 2.84 CM2
LVOT MG: 2 MMHG
LVOT MV: 0.67 M/S
LVOT PEAK VELOCITY: 0.98 M/S
LVOT VTI: 19.9 CM
MV E'TISSUE VEL-LAT: 0.07 M/S
MV E'TISSUE VEL-MED: 0.08 M/S
MV PEAK A VEL: 0.91 M/S
MV PEAK E VEL: 0.67 M/S
POSTERIOR WALL: 0.97 CM
PV PEAK GRADIENT: 4 MMHG
PV PV: 1.06 M/S
RVOT VMAX: 0.7 M/S
RVOT VTI: 14 CM
SEPTAL TISSUE DOPPLER FREE WALL LATE DIA VELOCITY (APICAL 4 CHAMBER VIEW): 0.14 M/S
TR MAX PG: 12 MMHG
TRICUSPID VALVE PEAK REGURGITATION VELOCITY: 1.76 M/S
Z-SCORE OF LEFT VENTRICULAR DIMENSION IN END DIASTOLE: -2.31
Z-SCORE OF LEFT VENTRICULAR DIMENSION IN END SYSTOLE: -1.91
Z-SCORE OF LEFT VENTRICULAR POSTERIOR WALL IN END DIASTOLE: 1.78

## 2022-01-12 PROCEDURE — 99214 OFFICE O/P EST MOD 30 MIN: CPT | Performed by: INTERNAL MEDICINE

## 2022-01-12 PROCEDURE — 3008F BODY MASS INDEX DOCD: CPT | Performed by: INTERNAL MEDICINE

## 2022-01-12 PROCEDURE — 93306 TTE W/DOPPLER COMPLETE: CPT | Performed by: INTERNAL MEDICINE

## 2022-01-12 PROCEDURE — 93000 ELECTROCARDIOGRAM COMPLETE: CPT | Performed by: INTERNAL MEDICINE

## 2022-01-12 NOTE — PROGRESS NOTES
Cardiology Outpatient Note    [facility] HEART [facility] Office  [address] [place] [address]     [facility]  [facility] [address] [facility]  [address] [place] [address]     TEL  [phone]  [facility]/mlhc     Tammy Walker is a 56 y.o. female with history of hypertension and tobacco use.  She occasionally smokes cigarettes and attributes much of this to stress at her work.  She works at the [employer].  She has worked there now for 37 years.    She had an echocardiogram today which I personally reviewed.  She has no significant valvular disease and normal left ventricular function.  She denies any chest pain palpitations or shortness of breath.  She is most concerned about varicose veins on her lower extremities which are not uncomfortable.  She is interested in discussing this further with vascular surgery.    She had blood work  which was notable for an LDL of 164 mg/dL which is higher than her normal values.  Her triglycerides were 181 and liver function tests were normal.  In that setting her primary care physician started her on atorvastatin 10 mg daily.  Blood work 10- showed  ALT 47 with  and triglycerides 285.  Additional risk factors include smoking.  Patient says that she has stopped her atorvastatin.  She does not want to take it.  She does not think she needs it.    She denies any acute illnesses or hospitalizations.  She continues to smoke.  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  She has had no COVID-related issues and is fully vaccinated.                                                         PMH     Medical History:  Past Medical History:   Diagnosis Date   • Hypertension    • Tobacco abuse        Surgical History:  Past Surgical History:   Procedure Laterality Date   • BREAST RECONSTRUCTION  ,    implant   •  SECTION         Social History:  "reports that she has been smoking. She has been smoking about 0.50 packs per day. She has never used smokeless tobacco. She reports current alcohol use. Drug use questions deferred to the physician.    Family History: She indicated that her biological mother is .      Allergies:No known allergies    Current Medications:    Outpatient Encounter Medications as of 2022:   •  multivitamin tablet, Take 1 tablet by mouth daily.  •  multivitamin with minerals (HAIR,SKIN AND NAILS) tablet, Take 1 tablet by mouth daily.  •  nebivoloL (BYSTOLIC) 5 mg tablet, Take 1 tablet (5 mg total) by mouth once daily.  •  [DISCONTINUED] atorvastatin 10 mg tablet, Take 1 tablet by mouth daily.                                                          OBJECTIVE   ROS as in HPI   Constitution: Negative for chills and fever.   Eyes: Negative for blurred vision and visual disturbance.   Cardiovascular: Negative for chest pain, dyspnea on exertion, near-syncope, palpitations and syncope.   Respiratory: Negative for hemoptysis, shortness of breath and wheezing.    Hematologic/Lymphatic: Negative for bleeding problem.   Skin: Negative for rash. No new skin changes  Gastrointestinal: Negative for abdominal pain, diarrhea, hematochezia, melena, nausea and vomiting.   Genitourinary: Negative for dysuria and hematuria.   Neurological: Negative for headaches.   Positive stress         Vitals:    22 1435   BP: 116/84   BP Location: Left upper arm   Patient Position: Sitting   Pulse: 61   Resp: 16   Weight: 64 kg (141 lb)   Height: 1.549 m (5' 1\")       BP Readings from Last 3 Encounters:   22 116/84   22 120/78   21 122/84     Wt Readings from Last 3 Encounters:   22 64 kg (141 lb)   22 64 kg (141 lb)   21 62 kg (136 lb 9.6 oz)       Physical Exam   Constitutional: Appears comfortable in no distress  HEENT:  Neck Supple.  No JVD No carotid bruits no cervical lymphadenopathy  Head: Normocephalic. "   Eyes: Extraocular movements intact  Cardiovascular: Normal rate, regular rhythm and normal heart sounds.  Exam reveals no friction rub.    Pulmonary/Chest: Effort normal and breath sounds normal. No wheezes.  Abdominal: Soft and nontender.  Musculoskeletal: No lower extremity edema. varicosities  Neurological: Alert and oriented to person, place, and time.   Skin: Skin is warm and dry.   Psychiatric: Behavior is normal.            Objective   No results found for: CHOL  No results found for: HDL  No results found for: LDLCALC  No results found for: TRIG  No results found for: ALT, AST  No results found for: WBC, HGB, HCT, PLT, INR  No results found for: GLUCOSE, NA, K, CO2, CL, BUN, CREATININE  No results found for: HGBA1C  No results found for: TSH  No results found for: BNP  [unfilled]    Troponin I Results     No lab values to display.                                                             IMAGING   Transthoracic echocardiogram 1-  · Normal-sized LV. Estimated EF 65- 70%. Wall motion appears grossly normal. Normal LV wall thickness. Doppler evaluation does not suggest increased filling pressures.  · Normal leaflet structure and normal leaflet motion of the mitral valve.  · No significant mitral valve stenosis.  · Tricuspid aortic valve. A 0.7 cm faint mobile echodensity is noted in LVOT associated with aortic valve most likely consistent with degenerative material/Lamdls excrescence. Sclerotic aortic valve leaflets. No aortic valve regurgitation.  · Trace mitral valve regurgitation.  · Aortic root normal. No evidence of aortic coarctation by doppler.  · Pulmonic valve not well visualized. Trace pulmonic valve regurgitation.  · Tricuspid valve structure is normal. Mild tricuspid valve regurgitation.  · Estimated RVSP = 15 mmHg.  · Normal-sized RA.  · Normal-sized LA. Intact LA septum present.  · Normal-sized RV. Normal RV systolic function.  · IVC is a small caliber vessel (<1.7cm). IVC collapses >50%  during inspiration.  · No evidence of pericardial effusion.       Exercise treadmill stress test 10-16-18  Normal stress EKG.  No significant arrhythmias or ischemic changes.  Normal blood pressure response.     EKG 1/12/2022   Sinus  Rhythm 61bpm [id number]  WITHIN NORMAL LIMITS                                             ASSESSMENT AND PLAN     Ms. Estes is a 56-year-old woman with the following issues:  Assessment/Plan    Essential hypertension  Continue with nebivolol 5 mg daily.  Results of echocardiogram discussed with patient.    Hypercholesterolemia  I had a lengthy discussion with her regarding the benefits of statin medication.  Although her calculated 10-year risk of cardiovascular guidelines per ACC/AHA guidelines was borderline and not severely elevated, she has an increased LDL, at times has had elevated triglyceride levels, and is a smoker.  Recommendation was that benefit of medications outweigh risks but she does not want to continue it and has not taken it for the last month.  She will try to work on changes in her diet and will have her blood work rechecked in the future.      I personally spent total 32 minutes face to face with the patient in counseling, review of records and discussion and/or coordination of care as described above.           Return in about 1 year (around 1/12/2023).       Thank you for allowing me to participate in the care of this patient.  I hope this information is helpful.         Elen Diaz MD [facility] VLADISLAV  1/12/2022  3:26 PM    This document was generated utilizing voice recognition technology. A reasonable attempt at proofreading has been made to minimize errors but please excuse any typographical errors which may be present. Please call with any questions.

## 2022-01-12 NOTE — ASSESSMENT & PLAN NOTE
I had a lengthy discussion with her regarding the benefits of statin medication.  Although her calculated 10-year risk of cardiovascular guidelines per ACC/AHA guidelines was borderline and not severely elevated, she has an increased LDL, at times has had elevated triglyceride levels, and is a smoker.  Recommendation was that benefit of medications outweigh risks but she does not want to continue it and has not taken it for the last month.  She will try to work on changes in her diet and will have her blood work rechecked in the future.

## 2022-01-12 NOTE — LETTER
January 12, 2022     Artemio Julio MD  [address]  [address]  [place] [address]    Patient: Tammy Walker  YOB: 1965  Date of Visit: 1/12/2022      Dear Dr. Julio:    Thank you for referring Tammy Walker to me for evaluation. Below are my notes for this consultation.    If you have questions, please do not hesitate to call me. I look forward to following your patient along with you.         Sincerely,        Elen Diaz MD        CC: No Recipients  Elen Diaz MD  1/12/2022  3:26 PM  Signed       Cardiology Outpatient Note    [facility]  [address] [place] [address]     [facility]  [facility]  [address] [place] [address]     TEL  [phone]  [facility]     Tammy Walker is a 56 y.o. female with history of hypertension and tobacco use.  She occasionally smokes cigarettes and attributes much of this to stress at her work.  She works at the [employer].  She has worked there now for 37 years.    She had an echocardiogram today which I personally reviewed.  She has no significant valvular disease and normal left ventricular function.  She denies any chest pain palpitations or shortness of breath.  She is most concerned about varicose veins on her lower extremities which are not uncomfortable.  She is interested in discussing this further with vascular surgery.    She had blood work 9-2021 which was notable for an LDL of 164 mg/dL which is higher than her normal values.  Her triglycerides were 181 and liver function tests were normal.  In that setting her primary care physician started her on atorvastatin 10 mg daily.  Blood work 10- showed  ALT 47 with  and triglycerides 285.  Additional risk factors include smoking.  Patient says that she has stopped her atorvastatin.  She does not want to take it.  She does not think she needs it.    She denies any acute  illnesses or hospitalizations.  She continues to smoke.  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  She has had no COVID-related issues and is fully vaccinated.                                                         [facility]     Medical History:  Past Medical History:   Diagnosis Date   • Hypertension    • Tobacco abuse        Surgical History:  Past Surgical History:   Procedure Laterality Date   • BREAST RECONSTRUCTION  ,    implant   •  SECTION         Social History: reports that she has been smoking. She has been smoking about 0.50 packs per day. She has never used smokeless tobacco. She reports current alcohol use. Drug use questions deferred to the physician.    Family History: She indicated that her biological mother is .      Allergies:No known allergies    Current Medications:    Outpatient Encounter Medications as of 2022:   •  multivitamin tablet, Take 1 tablet by mouth daily.  •  multivitamin with minerals (HAIR,SKIN AND NAILS) tablet, Take 1 tablet by mouth daily.  •  nebivoloL (BYSTOLIC) 5 mg tablet, Take 1 tablet (5 mg total) by mouth once daily.  •  [DISCONTINUED] atorvastatin 10 mg tablet, Take 1 tablet by mouth daily.                                                          OBJECTIVE   ROS as in HPI   Constitution: Negative for chills and fever.   Eyes: Negative for blurred vision and visual disturbance.   Cardiovascular: Negative for chest pain, dyspnea on exertion, near-syncope, palpitations and syncope.   Respiratory: Negative for hemoptysis, shortness of breath and wheezing.    Hematologic/Lymphatic: Negative for bleeding problem.   Skin: Negative for rash. No new skin changes  Gastrointestinal: Negative for abdominal pain, diarrhea, hematochezia, melena, nausea and vomiting.   Genitourinary: Negative for dysuria and hematuria.   Neurological: Negative for headaches.   Positive stress         Vitals:    22 1435   BP: 116/84  "  BP Location: Left upper arm   Patient Position: Sitting   Pulse: 61   Resp: 16   Weight: 64 kg (141 lb)   Height: 1.549 m (5' 1\")       BP Readings from Last 3 Encounters:   01/12/22 116/84   01/12/22 120/78   11/03/21 122/84     Wt Readings from Last 3 Encounters:   01/12/22 64 kg (141 lb)   01/12/22 64 kg (141 lb)   11/03/21 62 kg (136 lb 9.6 oz)       Physical Exam   Constitutional: Appears comfortable in no distress  HEENT:  Neck Supple.  No JVD No carotid bruits no cervical lymphadenopathy  Head: Normocephalic.   Eyes: Extraocular movements intact  Cardiovascular: Normal rate, regular rhythm and normal heart sounds.  Exam reveals no friction rub.    Pulmonary/Chest: Effort normal and breath sounds normal. No wheezes.  Abdominal: Soft and nontender.  Musculoskeletal: No lower extremity edema. varicosities  Neurological: Alert and oriented to person, place, and time.   Skin: Skin is warm and dry.   Psychiatric: Behavior is normal.            Objective   No results found for: CHOL  No results found for: HDL  No results found for: LDLCALC  No results found for: TRIG  No results found for: ALT, AST  No results found for: WBC, HGB, HCT, PLT, INR  No results found for: GLUCOSE, NA, K, CO2, CL, BUN, CREATININE  No results found for: HGBA1C  No results found for: TSH  No results found for: BNP  [unfilled]    Troponin I Results     No lab values to display.                                                             IMAGING   Transthoracic echocardiogram 1-  · Normal-sized LV. Estimated EF 65- 70%. Wall motion appears grossly normal. Normal LV wall thickness. Doppler evaluation does not suggest increased filling pressures.  · Normal leaflet structure and normal leaflet motion of the mitral valve.  · No significant mitral valve stenosis.  · Tricuspid aortic valve. A 0.7 cm faint mobile echodensity is noted in LVOT associated with aortic valve most likely consistent with degenerative material/Lamdls excrescence. " Sclerotic aortic valve leaflets. No aortic valve regurgitation.  · Trace mitral valve regurgitation.  · Aortic root normal. No evidence of aortic coarctation by doppler.  · Pulmonic valve not well visualized. Trace pulmonic valve regurgitation.  · Tricuspid valve structure is normal. Mild tricuspid valve regurgitation.  · Estimated RVSP = 15 mmHg.  · Normal-sized RA.  · Normal-sized LA. Intact LA septum present.  · Normal-sized RV. Normal RV systolic function.  · IVC is a small caliber vessel (<1.7cm). IVC collapses >50% during inspiration.  · No evidence of pericardial effusion.       Exercise treadmill stress test 10-16-18  Normal stress EKG.  No significant arrhythmias or ischemic changes.  Normal blood pressure response.     EKG 1/12/2022   Sinus  Rhythm 61bpm [id number]  WITHIN NORMAL LIMITS                                             ASSESSMENT AND PLAN     Ms. Estes is a 56-year-old woman with the following issues:  Assessment/Plan    Essential hypertension  Continue with nebivolol 5 mg daily.  Results of echocardiogram discussed with patient.    Hypercholesterolemia  I had a lengthy discussion with her regarding the benefits of statin medication.  Although her calculated 10-year risk of cardiovascular guidelines per ACC/AHA guidelines was borderline and not severely elevated, she has an increased LDL, at times has had elevated triglyceride levels, and is a smoker.  Recommendation was that benefit of medications outweigh risks but she does not want to continue it and has not taken it for the last month.  She will try to work on changes in her diet and will have her blood work rechecked in the future.      I personally spent total 32 minutes face to face with the patient in counseling, review of records and discussion and/or coordination of care as described above.           Return in about 1 year (around 1/12/2023).       Thank you for allowing me to participate in the care of this patient.  I hope this  information is helpful.         Elen Diaz MD [facility]  1/12/2022  3:26 PM    This document was generated utilizing voice recognition technology. A reasonable attempt at proofreading has been made to minimize errors but please excuse any typographical errors which may be present. Please call with any questions.

## 2022-04-27 ENCOUNTER — OFFICE VISIT (OUTPATIENT)
Dept: VASCULAR SURGERY | Facility: CLINIC | Age: 57
End: 2022-04-27
Payer: COMMERCIAL

## 2022-04-27 VITALS — DIASTOLIC BLOOD PRESSURE: 80 MMHG | WEIGHT: 142 LBS | SYSTOLIC BLOOD PRESSURE: 121 MMHG | BODY MASS INDEX: 26.83 KG/M2

## 2022-04-27 DIAGNOSIS — I87.8 VENOUS INTERMITTENT CLAUDICATION: Primary | ICD-10-CM

## 2022-04-27 PROCEDURE — 99203 OFFICE O/P NEW LOW 30 MIN: CPT | Performed by: SURGERY

## 2022-04-27 PROCEDURE — 3008F BODY MASS INDEX DOCD: CPT | Performed by: SURGERY

## 2022-04-27 ASSESSMENT — ENCOUNTER SYMPTOMS
UNEXPECTED WEIGHT CHANGE: 0
ABDOMINAL PAIN: 0
POLYPHAGIA: 0
DYSURIA: 0
FEVER: 0
ADENOPATHY: 0
PSYCHIATRIC NEGATIVE: 1
WEAKNESS: 0
FACIAL SWELLING: 0
ARTHRALGIAS: 0
SHORTNESS OF BREATH: 0

## 2022-04-27 NOTE — ASSESSMENT & PLAN NOTE
We will arrange a venous reflux ultrasound of the bilateral lower extremities,  Suspect a we will have options for venous and endovenous therapies.  We will review and discuss the procedural options once the ultrasound has been completed.  We also recommended use of compression garments of the bilateral lower extremities in the meantime.

## 2022-04-27 NOTE — LETTER
April 27, 2022     Artemio Julio MD  [address]  [address]  [place] [address]    Patient: Tammy Walker  YOB: 1965  Date of Visit: 4/27/2022      Dear Dr. Julio:    Thank you for referring Tammy Walker to me for evaluation. Below are my notes for this consultation.    If you have questions, please do not hesitate to call me. I look forward to following your patient along with you.         Sincerely,        Artem Valenzuela MD        CC: No Recipients  Artem Valenzuela MD  4/27/2022  1:16 PM  Signed       [facility]  Initial Consultation -  Venous Issues  @ [facility]        DETAILS OF CONSULTATION   4/27/2022  Tammy Walker               YOB: 1965  [id number]    Consulting Service:  [facility]    PCP:  Artemio Julio MD    Reason for Consultation: Varicose Veins      HISTORY OF PRESENT ILLNESS        Tammy Walker is a 56 y.o. female with bilateral varicose veins, symptomatic, works at [employer], has worked as a  for 40 years.  Obviously has significant amount of time standing.  She does report wearing knee-high compression socks when she is at work.  She does have associated swelling tiredness skin irritation and other venous claudication type symptoms bilaterally.      Venous history:   Prior DVT: no  Prior phlebitis:  None  Existing IVC Filter:  no  Bleeding/clotting disorder: None  Family venous history: n/a  Prior Pulmonary Embolus: None  Venous Stasis dermatitis or ulcer:  No  Prior Spider/Reticular Treatment:  No  Analgesics: OTC NSAID pain medication use :  PRN.  Compression Stocking/Support Hose:  Patient has been using - yes, knee high level    VCSS Total RLE: (P) 7  VCSS Total LLE: (P) 7  Clinical Class LLE: (P) C3-->Edema w/o skin changes  Clinical Class RLE: (P) C3-->Edema w/o skin changes    PAST MEDICAL AND SURGICAL HISTORY        Past Medical History:   Diagnosis Date   •  Hypertension    • Tobacco abuse        Past Surgical History:   Procedure Laterality Date   • BREAST RECONSTRUCTION  ,    implant   •  SECTION         MEDICATIONS          Current Outpatient Medications:   •  multivitamin tablet, Take 1 tablet by mouth daily., Disp: , Rfl:   •  multivitamin with minerals (HAIR,SKIN AND NAILS) tablet, Take 1 tablet by mouth daily., Disp: , Rfl:   •  nebivoloL (BYSTOLIC) 5 mg tablet, Take 1 tablet (5 mg total) by mouth once daily., Disp: 90 tablet, Rfl: 1    ALLERGIES        No known allergies    FAMILY HISTORY        Family History   Problem Relation Age of Onset   • Diabetes Biological Mother    • Hypertension Biological Mother    • Heart attack Biological Mother        SOCIAL/ TOBACCO HISTORY        Social History     Socioeconomic History   • Marital status:      Spouse name: None   • Number of children: None   • Years of education: None   • Highest education level: None   Tobacco Use   • Smoking status: Current Every Day Smoker     Packs/day: 0.50   • Smokeless tobacco: Never Used   Substance and Sexual Activity   • Alcohol use: Yes     Comment: socially   • Drug use: Defer   • Sexual activity: Defer     Social History     Tobacco Use   Smoking Status Current Every Day Smoker   • Packs/day: 0.50   Smokeless Tobacco Never Used       REVIEW OF SYSTEMS      Review of Systems   Constitutional: Negative for fever and unexpected weight change.   HENT: Negative for facial swelling.    Eyes: Negative for visual disturbance.   Respiratory: Negative for shortness of breath.    Cardiovascular: Negative for chest pain.   Gastrointestinal: Negative for abdominal pain.   Endocrine: Negative for polyphagia.   Genitourinary: Negative for dysuria.   Musculoskeletal: Negative for arthralgias.   Skin: Negative for rash.   Neurological: Negative for syncope and weakness.   Hematological: Negative for adenopathy.   Psychiatric/Behavioral: Negative.        PHYSICAL EXAMINATION       Visit Vitals  /80 (BP Location: Right upper arm)   Wt 64.4 kg (142 lb)   BMI 26.83 kg/m²     Body mass index is 26.83 kg/m².  Physical Exam  Vitals reviewed.   Constitutional:       Appearance: She is well-developed.   HENT:      Head: Normocephalic and atraumatic.   Eyes:      Pupils: Pupils are equal, round, and reactive to light.   Neck:      Trachea: No tracheal deviation.   Cardiovascular:      Rate and Rhythm: Normal rate.   Pulmonary:      Effort: Pulmonary effort is normal.      Breath sounds: Normal breath sounds.   Abdominal:      Palpations: Abdomen is soft. There is no mass.   Musculoskeletal:         General: Normal range of motion.      Cervical back: Normal range of motion and neck supple.        Legs:    Skin:     General: Skin is warm and dry.   Neurological:      Mental Status: She is alert and oriented to person, place, and time.                               LABS / IMAGING / EKG        Labs  I have reviewed the patient's pertinent labs.      Imaging  I have independently reviewed the pertinent imaging from the EMR and pertinent outside records.    Recommend venous reflux examination.     ASSESSMENT AND PLAN         Problem List Items Addressed This Visit        Nervous    Venous intermittent claudication - Primary    Overview     Bilateral lower extremity venous insufficiency           Current Assessment & Plan     We will arrange a venous reflux ultrasound of the bilateral lower extremities,  Suspect a we will have options for venous and endovenous therapies.  We will review and discuss the procedural options once the ultrasound has been completed.  We also recommended use of compression garments of the bilateral lower extremities in the meantime.           Relevant Orders    Ultrasound venous reflux leg bilateral            Artem Valenzuela MD, FACS       Return in about 4 weeks (around 5/25/2022) for To review ordered studies, Recheck.       Artem Valenzuela MD, FACS  Vascular  and Endovascular Specialist  [place] [address]  Phone [phone]  Fax  [phone]     Thank you very much for allowing us to participate in the care of your patient.  Please not hesitate to call or email if there are any questions. Sincerely.    This document was generated utilizing voice recognition technology. An attempt at proofreading has been made to minimize errors but typographical errors may be present.

## 2022-04-27 NOTE — PROGRESS NOTES
[facility]  Initial Consultation -  Venous Issues  @ [facility]        DETAILS OF CONSULTATION   2022  Tammy Walker               YOB: 1965  [id number]    Consulting Service:  [facility]    PCP:  Artemio Julio MD    Reason for Consultation: Varicose Veins      HISTORY OF PRESENT ILLNESS        Tammy Walker is a 56 y.o. female with bilateral varicose veins, symptomatic, works at [employer], has worked as a  for 40 years.  Obviously has significant amount of time standing.  She does report wearing knee-high compression socks when she is at work.  She does have associated swelling tiredness skin irritation and other venous claudication type symptoms bilaterally.      Venous history:   Prior DVT: no  Prior phlebitis:  None  Existing IVC Filter:  no  Bleeding/clotting disorder: None  Family venous history: n/a  Prior Pulmonary Embolus: None  Venous Stasis dermatitis or ulcer:  No  Prior Spider/Reticular Treatment:  No  Analgesics: OTC NSAID pain medication use :  PRN.  Compression Stocking/Support Hose:  Patient has been using - yes, knee high level    VCSS Total RLE: (P) 7  VCSS Total LLE: (P) 7  Clinical Class LLE: (P) C3-->Edema w/o skin changes  Clinical Class RLE: (P) C3-->Edema w/o skin changes    PAST MEDICAL AND SURGICAL HISTORY        Past Medical History:   Diagnosis Date   • Hypertension    • Tobacco abuse        Past Surgical History:   Procedure Laterality Date   • BREAST RECONSTRUCTION      implant   •  SECTION         MEDICATIONS          Current Outpatient Medications:   •  multivitamin tablet, Take 1 tablet by mouth daily., Disp: , Rfl:   •  multivitamin with minerals (HAIR,SKIN AND NAILS) tablet, Take 1 tablet by mouth daily., Disp: , Rfl:   •  nebivoloL (BYSTOLIC) 5 mg tablet, Take 1 tablet (5 mg total) by mouth once daily., Disp: 90 tablet, Rfl: 1    ALLERGIES        No known  allergies    FAMILY HISTORY        Family History   Problem Relation Age of Onset   • Diabetes Biological Mother    • Hypertension Biological Mother    • Heart attack Biological Mother        SOCIAL/ TOBACCO HISTORY        Social History     Socioeconomic History   • Marital status:      Spouse name: None   • Number of children: None   • Years of education: None   • Highest education level: None   Tobacco Use   • Smoking status: Current Every Day Smoker     Packs/day: 0.50   • Smokeless tobacco: Never Used   Substance and Sexual Activity   • Alcohol use: Yes     Comment: socially   • Drug use: Defer   • Sexual activity: Defer     Social History     Tobacco Use   Smoking Status Current Every Day Smoker   • Packs/day: 0.50   Smokeless Tobacco Never Used       REVIEW OF SYSTEMS      Review of Systems   Constitutional: Negative for fever and unexpected weight change.   HENT: Negative for facial swelling.    Eyes: Negative for visual disturbance.   Respiratory: Negative for shortness of breath.    Cardiovascular: Negative for chest pain.   Gastrointestinal: Negative for abdominal pain.   Endocrine: Negative for polyphagia.   Genitourinary: Negative for dysuria.   Musculoskeletal: Negative for arthralgias.   Skin: Negative for rash.   Neurological: Negative for syncope and weakness.   Hematological: Negative for adenopathy.   Psychiatric/Behavioral: Negative.        PHYSICAL EXAMINATION      Visit Vitals  /80 (BP Location: Right upper arm)   Wt 64.4 kg (142 lb)   BMI 26.83 kg/m²     Body mass index is 26.83 kg/m².  Physical Exam  Vitals reviewed.   Constitutional:       Appearance: She is well-developed.   HENT:      Head: Normocephalic and atraumatic.   Eyes:      Pupils: Pupils are equal, round, and reactive to light.   Neck:      Trachea: No tracheal deviation.   Cardiovascular:      Rate and Rhythm: Normal rate.   Pulmonary:      Effort: Pulmonary effort is normal.      Breath sounds: Normal breath  sounds.   Abdominal:      Palpations: Abdomen is soft. There is no mass.   Musculoskeletal:         General: Normal range of motion.      Cervical back: Normal range of motion and neck supple.        Legs:    Skin:     General: Skin is warm and dry.   Neurological:      Mental Status: She is alert and oriented to person, place, and time.                               LABS / IMAGING / EKG        Labs  I have reviewed the patient's pertinent labs.      Imaging  I have independently reviewed the pertinent imaging from the EMR and pertinent outside records.    Recommend venous reflux examination.     ASSESSMENT AND PLAN         Problem List Items Addressed This Visit        Nervous    Venous intermittent claudication - Primary    Overview     Bilateral lower extremity venous insufficiency           Current Assessment & Plan     We will arrange a venous reflux ultrasound of the bilateral lower extremities,  Suspect a we will have options for venous and endovenous therapies.  We will review and discuss the procedural options once the ultrasound has been completed.  We also recommended use of compression garments of the bilateral lower extremities in the meantime.           Relevant Orders    Ultrasound venous reflux leg bilateral            Artem Valenzuela MD, FACS       Return in about 4 weeks (around 5/25/2022) for To review ordered studies, Recheck.       Artem Valenzuela MD, FACS  Vascular and Endovascular Specialist  [place] [address]  Phone [phone]  Fax  [phone]     Thank you very much for allowing us to participate in the care of your patient.  Please not hesitate to call or email if there are any questions. Sincerely.    This document was generated utilizing voice recognition technology. An attempt at proofreading has been made to minimize errors but typographical errors may be present.

## 2022-05-05 ENCOUNTER — HOSPITAL ENCOUNTER (OUTPATIENT)
Dept: CARDIOLOGY | Facility: CLINIC | Age: 57
Discharge: HOME | End: 2022-05-05
Attending: SURGERY
Payer: COMMERCIAL

## 2022-05-05 VITALS — WEIGHT: 142 LBS | HEIGHT: 61 IN | BODY MASS INDEX: 26.81 KG/M2

## 2022-05-05 DIAGNOSIS — I87.8 VENOUS INTERMITTENT CLAUDICATION: ICD-10-CM

## 2022-05-05 PROCEDURE — 93970 EXTREMITY STUDY: CPT | Performed by: SURGERY

## 2022-05-06 LAB
BSA FOR ECHO PROCEDURE: 1.66 M2
LT AASV AP: 0.36 CM
LT AASV TRANS: 0.44 CM
LT CFV REFLUX: 0.9 SEC
LT GSV 2CM DISTAL TO SFJ AP: 0.38 CM
LT GSV 2CM DISTAL TO SFJ TRANS: 0.5 CM
LT GSV AT SFJ AP: 0.55 CM
LT GSV AT SFJ TRANS: 0.71 CM
LT GSV KNEE AP: 0.28 CM
LT GSV KNEE REFLUX: 2.67 SEC
LT GSV KNEE TRANS: 0.27 CM
LT GSV PROX CALF AP: 0.23 CM
LT GSV PROX CALF TRANS: 0.28 CM
LT GSV PROX THIGH AP: 0.36 CM
LT GSV PROX THIGH TRANS: 0.37 CM
LT SSV PROX AP: 0.37 CM
LT SSV PROX REFLUX: 1.22 SEC
LT SSV PROX TRANS: 0.39 CM
RT AASV AP: 0.24 CM
RT AASV TRANS: 0.33 CM
RT GSV 2CM DISTAL TO SFJ AP: 0.34 CM
RT GSV 2CM DISTAL TO SFJ TRANS: 0.61 CM
RT GSV AT SFJ AP: 0.4 CM
RT GSV AT SFJ TRANS: 0.52 CM
RT GSV KNEE AP: 0.27 CM
RT GSV KNEE TRANS: 0.24 CM
RT GSV PROX CALF AP: 0.26 CM
RT GSV PROX CALF TRANS: 0.37 CM
RT GSV PROX THIGH AP: 0.32 CM
RT GSV PROX THIGH TRANS: 0.29 CM
RT SSV PROX AP: 0.36 CM
RT SSV PROX TRANS: 0.41 CM

## 2022-05-09 ENCOUNTER — TELEMEDICINE (OUTPATIENT)
Dept: VASCULAR SURGERY | Facility: CLINIC | Age: 57
End: 2022-05-09
Payer: COMMERCIAL

## 2022-05-09 DIAGNOSIS — I87.8 VENOUS INTERMITTENT CLAUDICATION: Primary | ICD-10-CM

## 2022-05-09 PROCEDURE — 99214 OFFICE O/P EST MOD 30 MIN: CPT | Mod: 95 | Performed by: SURGERY

## 2022-05-09 NOTE — ASSESSMENT & PLAN NOTE
Has anatomy that would be reasonable to proceed with a left SSV and a lower leg left GSV ablation with RFA.  Continue compression socks in the meantime.

## 2022-05-09 NOTE — PROGRESS NOTES
[facility]  [facility], [address]  [address] [facility] [address]  (P)[phone] (F) [phone]       TELEMEDICINE ENCOUNTER  History of Present Illness/Chief Complaint       Given the COVID-19 outbreak, elevated risk patients who are established in the practice were encouraged to change their in person appointment to a telemedicine encounter and the patient wished to do so.    Request for Consent:  You and I are about to have a telemedicine check-in or visit. This is allowed because you have requested an appointment during the COVID-19 pandemic.  This telemedicine visit will be billed to your health insurance or you, if you are self-insured.  You understand you will be responsible for any copayments or coinsurances that apply to your telemedicine visit.  Before starting our telemedicine visit, I am required to get your consent for this virtual check-in or visit by telemedicine. Do you consent?      Patient Response to Request for Consent: Yes  Visit Type performed: Audio Only      Verification of Patient Location:  The patient affirms they are currently located in the following state:[place]     1965   Re: Tammy Walker     Given the COVID-19 pandemic, elderly and/or patients at high risk were encouraged to shelter in place therefore@ did not come in to the [facility] for evaluation follow-up of venous insufficiency.  Had venous reflux examination which we reviewed together today.  Her symptoms are otherwise as documented in prior note, swelling visible varicose veins with venous claudication at times.  Has been using compression socks.      Past Medical Hx - Surgical Hx - Family Hx - Social Hx       Medical History:   Past Medical History:   Diagnosis Date   • Hypertension    • Tobacco abuse        Surgical History:   Past Surgical History:   Procedure Laterality Date   • BREAST RECONSTRUCTION  ,    implant   •   SECTION         Social History:   Social History     Socioeconomic History   • Marital status:    Tobacco Use   • Smoking status: Current Every Day Smoker     Packs/day: 0.50   • Smokeless tobacco: Never Used   Substance and Sexual Activity   • Alcohol use: Yes     Comment: socially   • Drug use: Defer   • Sexual activity: Defer       Family History:   Family History   Problem Relation Age of Onset   • Diabetes Biological Mother    • Hypertension Biological Mother    • Heart attack Biological Mother          Allergies and Medications       Allergies: No known allergies    No current facility-administered medications for this visit.        Medication List          Accurate as of May 9, 2022 12:24 PM. If you have any questions, ask your nurse or doctor.            CONTINUE taking these medications    HAIR,SKIN AND NAILS tablet  Take 1 tablet by mouth daily.  Dose: 1 tablet  Generic drug: multivitamin with minerals     multivitamin tablet  Take 1 tablet by mouth daily.  Dose: 1 tablet     nebivoloL 5 mg tablet  Commonly known as: BYSTOLIC  Take 1 tablet (5 mg total) by mouth once daily.  Dose: 5 mg              Laboratory Results     Hematology  No results found for: WBC, HGB, HCT, PLT, INR    Chemistries  No results found for: NA, K, CL, CREATININE, BUN, CO2, GLUCOSE, CALCIUM, MG, ALT, AST      Review of Systems       REVIEW OF SYSTEMS: Aside from what is mentioned above, a 14 point review of systems was performed and was negative.      Physical Exam       PHYSICAL EXAMINATION:  Vital Signs: There were no vitals taken for this visit.  Since this was a telemedicine check-in.     At his last visit his examination showed:      5/5/2022    Interpretation Summary    1.  No sonographic evidence of deep venous thrombosis in either lower extremity.  2.  Superficial venous incompetence demonstrated within the patient's left lower extremity.    Study Findings and Details    Study Details Venous duplex examination using  B-mode, color Doppler and spectral Doppler, including diameter measurements.       Vascular Findings    Right Lower Venous Right common femoral vein, saphenofemoral junction, great saphenous vein, deep femoral vein, proximal femoral vein, mid femoral vein, distal femoral vein, popliteal vein, small saphenous, gastrocnemius vein, post tibial vein and peroneal vein normally compressible and demonstrates normal spontaneous, phasic flow with normal augmentation.   Left Lower Venous Left common femoral vein, saphenofemoral junction, great saphenous vein, deep femoral vein, proximal femoral vein, mid femoral vein, distal femoral vein, popiteal vein, small saphenous vein, gastrocnemius vein, post tibial vein and peroneal vein normally compressible and demonstrates normal spontaneous, phasic flow with normal augmentation.   Right Venous Reflux The exam was performed and the measurements were taken with the patient in a position of reverse trendelenburg.   Left Venous Reflux The exam was performed and the measurements were taken with the patient in a position of reverse trendelenburg.          Right Superficial Venous Reflux Measurements    Superficial Venous AP (cm) Trans (cm)   AASV 0.24 cm        0.33 cm          GSV at SFJ 0.40 cm        0.52 cm          GSV 2 cm Distal to SFJ 0.34 cm        0.61 cm          GSV Proximal Thigh 0.32 cm        0.29 cm          GSV at Knee 0.27 cm        0.24 cm          GSV Proximal Calf 0.26 cm        0.37 cm          SSV Proximal Calf 0.36 cm        0.41 cm                        Left Deep Venous Reflux Measurements    Deep Venous Reflux Time (sec)   Common Femoral 0.90 sec               Left Superficial Venous Measurements    Superficial Venous AP (cm) Trans (cm) Reflux Time (sec)   AASV 0.36 cm        0.44 cm              GSV at SFJ 0.55 cm        0.71 cm              GSV 2 cm Distal to SFJ 0.38 cm        0.50 cm              GSV Proximal Thigh 0.36 cm        0.37 cm              GSV  at Knee 0.28 cm        0.27 cm        2.67 sec          GSV Proximal Calf 0.23 cm        0.28 cm              SSV Proximal Calf 0.37 cm        0.39 cm        1.22 sec                     Assessment and Plan       Assessment/Plan     Problem List Items Addressed This Visit        Nervous    Venous intermittent claudication - Primary    Overview     Bilateral lower extremity venous insufficiency           Current Assessment & Plan     Has anatomy that would be reasonable to proceed with a left SSV and a lower leg left GSV ablation with RFA.  Continue compression socks in the meantime.                   Artem Valenzuela MD  5/9/2022      I spent 19 minutes on this date of service performing the following activities: documenting, obtaining / reviewing records, providing counseling and education, independently reviewing study/studies and communicating results.     55767: 15-29  20881: 30-44  20759: 45-59    85292: 10-19  81214: 20-29  77099: 30-39

## 2022-05-14 DIAGNOSIS — I10 ESSENTIAL HYPERTENSION: ICD-10-CM

## 2022-05-16 RX ORDER — NEBIVOLOL 5 MG/1
TABLET ORAL
Qty: 90 TABLET | Refills: 1 | Status: SHIPPED | OUTPATIENT
Start: 2022-05-16 | End: 2022-11-02

## 2022-11-02 DIAGNOSIS — I10 ESSENTIAL HYPERTENSION: ICD-10-CM

## 2022-11-02 RX ORDER — NEBIVOLOL 5 MG/1
TABLET ORAL
Qty: 90 TABLET | Refills: 1 | Status: SHIPPED | OUTPATIENT
Start: 2022-11-02 | End: 2023-05-08

## 2023-01-11 ENCOUNTER — OFFICE VISIT (OUTPATIENT)
Dept: CARDIOLOGY | Facility: CLINIC | Age: 58
End: 2023-01-11
Payer: COMMERCIAL

## 2023-01-11 VITALS
HEART RATE: 62 BPM | WEIGHT: 147.2 LBS | BODY MASS INDEX: 27.79 KG/M2 | DIASTOLIC BLOOD PRESSURE: 80 MMHG | SYSTOLIC BLOOD PRESSURE: 110 MMHG | HEIGHT: 61 IN | RESPIRATION RATE: 16 BRPM

## 2023-01-11 DIAGNOSIS — I10 ESSENTIAL HYPERTENSION: Primary | ICD-10-CM

## 2023-01-11 PROCEDURE — 3008F BODY MASS INDEX DOCD: CPT | Performed by: INTERNAL MEDICINE

## 2023-01-11 PROCEDURE — 93000 ELECTROCARDIOGRAM COMPLETE: CPT | Performed by: INTERNAL MEDICINE

## 2023-01-11 PROCEDURE — 99213 OFFICE O/P EST LOW 20 MIN: CPT | Performed by: INTERNAL MEDICINE

## 2023-01-11 NOTE — PROGRESS NOTES
Cardiology Outpatient Note    [facility]ART [facility] Office  [address] [place] [address]     [facility]  [facility] [address]  [address] [place] [address]     TEL  [phone]  [facility]     Tammy Walker is a 57 y.o. female with history of hypertension and tobacco use.  She occasionally smokes cigarettes and attributes much of this to stress at her work.  She works at the [employer].  She has worked there now for 37 years.  She retired in June 2022.  She feels great and spending more time with loved ones.  She still smokes less than 14 cigarettes daily.  She says that she was smoking fewer than 8 cigarettes daily but increased over the holidays.  She says that she also likes pecan pie, whipped cream, mayonnaise.     She had been asked to have repeat lipid profile at last visit but has not done so. She had blood work 9-2021 which was notable for an LDL of 164 mg/dL which is higher than her normal values.  Her triglycerides were 181 and liver function tests were normal.  In that setting her primary care physician started her on atorvastatin 10 mg daily.  Blood work 10- showed  ALT 47 with  and triglycerides 285.  Additional risk factors include smoking.  Patient says that she  stopped her atorvastatin.  She does not want to take it.     She is looking into getting term life insurance and has been given a prescription for blood work on that basis which includes lipid profile.  When she has that she has been asked to send the results to our office.      Echocardiogram showed no significant valvular disease and normal left ventricular function.  She denies any chest pain palpitations or shortness of breath.  She is most concerned about varicose veins on her lower extremities which are not uncomfortable.  She she saw vascular surgery and ablation was offered but she does not wish to pursue it at this  time.    She denies any acute illnesses or hospitalizations.   She had COVID 2022 but did not have major symptoms or require hospitalization.  She is vaccinated.                                                         [facility]     Medical History:  Past Medical History:   Diagnosis Date   • Hypertension    • Tobacco abuse        Surgical History:  Past Surgical History:   Procedure Laterality Date   • BREAST RECONSTRUCTION  ,    implant   •  SECTION         Social History:  Social History     Tobacco Use   • Smoking status: Every Day     Packs/day: 0.50     Types: Cigarettes   • Smokeless tobacco: Never   Substance Use Topics   • Alcohol use: Yes     Comment: socially   • Drug use: Defer       Family History: She indicated that her biological mother is .      Allergies:No known allergies    Current Medications:    Outpatient Encounter Medications as of 2023:   •  GARLIC ORAL, Take 1 tablet by mouth daily.  •  multivitamin tablet, Take 1 tablet by mouth daily.  •  multivitamin with minerals (HAIR,SKIN AND NAILS ORAL), Take 1 tablet by mouth daily.  •  nebivoloL (BYSTOLIC) 5 mg tablet, TAKE 1 TABLET BY MOUTH EVERY DAY  •  multivitamin with minerals tablet, Take 1 tablet by mouth daily.                                                          OBJECTIVE   ROS as in HPI   Constitution: Negative for chills and fever.   Eyes: Negative for blurred vision and visual disturbance.   Cardiovascular: Negative for chest pain, dyspnea on exertion, near-syncope, palpitations and syncope.   Respiratory: Negative for hemoptysis, shortness of breath and wheezing.    Hematologic/Lymphatic: Negative for bleeding problem.   Skin: Negative for rash. No new skin changes  Gastrointestinal: Negative for abdominal pain, diarrhea, hematochezia, melena, nausea and vomiting.   Genitourinary: Negative for dysuria and hematuria.   Neurological: Negative for headaches.   She talked about protein shakes and other  "supplements she is using in hopes of improving the health of her hair       Vitals:    01/11/23 1132   BP: 110/80   BP Location: Left upper arm   Patient Position: Sitting   Pulse: 62   Resp: 16   Weight: 66.8 kg (147 lb 3.2 oz)   Height: 1.549 m (5' 0.98\")       BP Readings from Last 3 Encounters:   01/11/23 110/80   04/27/22 121/80   01/12/22 120/78     Wt Readings from Last 3 Encounters:   01/11/23 66.8 kg (147 lb 3.2 oz)   05/05/22 64.4 kg (142 lb)   04/27/22 64.4 kg (142 lb)       Physical Exam   Constitutional: Appears comfortable in no distress  HEENT:  Neck Supple.  No JVD No carotid bruits no cervical lymphadenopathy  Head: Normocephalic.   Eyes: Extraocular movements intact  Cardiovascular: Normal rate, regular rhythm no S3 gallop Exam reveals no friction rub.    Pulmonary/Chest: Effort normal and breath sounds normal. No wheezes.  Abdominal: Soft and nontender.  Musculoskeletal: No lower extremity edema. varicosities  Neurological: Alert and oriented to person, place, and time.   Skin: Skin is warm and dry.   Psychiatric: Behavior is normal.            Objective   No results found for: CHOL  No results found for: HDL  No results found for: LDLCALC  No results found for: TRIG  No results found for: ALT, AST  No results found for: WBC, HGB, HCT, PLT, INR  No results found for: GLUCOSE, NA, K, CO2, CL, BUN, CREATININE  No results found for: HGBA1C  No results found for: TSH  No results found for: BNP  [unfilled]    Troponin I Results     No lab values to display.                                                             IMAGING   Transthoracic echocardiogram 1-  · Normal-sized LV. Estimated EF 65- 70%. Wall motion appears grossly normal. Normal LV wall thickness. Doppler evaluation does not suggest increased filling pressures.  · Normal leaflet structure and normal leaflet motion of the mitral valve.  · No significant mitral valve stenosis.  · Tricuspid aortic valve. A 0.7 cm faint mobile echodensity " is noted in LVOT associated with aortic valve most likely consistent with degenerative material/Lamdls excrescence. Sclerotic aortic valve leaflets. No aortic valve regurgitation.  · Trace mitral valve regurgitation.  · Aortic root normal. No evidence of aortic coarctation by doppler.  · Pulmonic valve not well visualized. Trace pulmonic valve regurgitation.  · Tricuspid valve structure is normal. Mild tricuspid valve regurgitation.  · Estimated RVSP = 15 mmHg.  · Normal-sized RA.  · Normal-sized LA. Intact LA septum present.  · Normal-sized RV. Normal RV systolic function.  · IVC is a small caliber vessel (<1.7cm). IVC collapses >50% during inspiration.  · No evidence of pericardial effusion.       Exercise treadmill stress test 10-16-18  Normal stress EKG.  No significant arrhythmias or ischemic changes.  Normal blood pressure response.     EKG 1/11/2023   Sinus  Rhythm 62bpm [id number]  -Poor R-wave progression -nonspecific                                              ASSESSMENT AND PLAN     Ms. Estes is a 57-year-old woman with the following issues:  Assessment/Plan    Essential hypertension  Nebivolol 5 mg daily.  Lifestyle modification discussed at length as we always do in terms of exercise and diet.  Patient says she has gained weight over the holidays.    Hypercholesterolemia  Awaiting blood work which was ordered because she is trying to get term life insurance to assess repeat lipid profile.      Although her calculated 10-year risk of cardiovascular guidelines per ACC/AHA guidelines was borderline and not severely elevated, she has an increased LDL, at times has had elevated triglyceride levels, and is a smoker.  Her primary care physician had started atorvastatin but she stopped.  Did not have any particular symptoms just did not want to take the medication if she did not need to.     Recommendation was that benefit of medications outweigh risks.  She will try to work on changes in her diet and exercise  and tobacco cessation.                Return in about 6 months (around 7/11/2023).       Thank you for allowing me to participate in the care of this patient.  I hope this information is helpful.         Elen Diaz MD [facility]  1/11/2023  12:25 PM    This document was generated utilizing voice recognition technology. A reasonable attempt at proofreading has been made to minimize errors but please excuse any typographical errors which may be present. Please call with any questions.

## 2023-01-11 NOTE — ASSESSMENT & PLAN NOTE
Nebivolol 5 mg daily.  Lifestyle modification discussed at length as we always do in terms of exercise and diet.  Patient says she has gained weight over the holidays.

## 2023-01-11 NOTE — LETTER
January 11, 2023     Artemio Julio MD  [address] [facility] [address]  [address]  [facility] [address]    Patient: Tammy Walker  YOB: 1965  Date of Visit: 1/11/2023      Dear Dr. Julio:    Thank you for referring Tammy Walker to me for evaluation. Below are my notes for this consultation.    If you have questions, please do not hesitate to call me. I look forward to following your patient along with you.         Sincerely,        Elen Diaz MD        CC: No Recipients  Elen Diaz MD  1/11/2023 12:25 PM  Signed       Cardiology Outpatient Note    [facility] Office  [address] [place] [address]     [facility]  The Heart [facility] Level  [address] [place] [address]     TEL  [phone]  [facility]/mlhc     Tammy Walker is a 57 y.o. female with history of hypertension and tobacco use.  She occasionally smokes cigarettes and attributes much of this to stress at her work.  She works at the [employer].  She has worked there now for 37 years.  She retired in June 2022.  She feels great and spending more time with loved ones.  She still smokes less than 14 cigarettes daily.  She says that she was smoking fewer than 8 cigarettes daily but increased over the holidays.  She says that she also likes pecan pie, whipped cream, mayonnaise.     She had been asked to have repeat lipid profile at last visit but has not done so. She had blood work 9-2021 which was notable for an LDL of 164 mg/dL which is higher than her normal values.  Her triglycerides were 181 and liver function tests were normal.  In that setting her primary care physician started her on atorvastatin 10 mg daily.  Blood work 10- showed  ALT 47 with  and triglycerides 285.  Additional risk factors include smoking.  Patient says that she  stopped her atorvastatin.  She does not want to take it.     She is looking into getting term life insurance and has been  given a prescription for blood work on that basis which includes lipid profile.  When she has that she has been asked to send the results to our office.      Echocardiogram showed no significant valvular disease and normal left ventricular function.  She denies any chest pain palpitations or shortness of breath.  She is most concerned about varicose veins on her lower extremities which are not uncomfortable.  She she saw vascular surgery and ablation was offered but she does not wish to pursue it at this time.    She denies any acute illnesses or hospitalizations.   She had COVID 2022 but did not have major symptoms or require hospitalization.  She is vaccinated.                                                         [facility]     Medical History:  Past Medical History:   Diagnosis Date   • Hypertension    • Tobacco abuse        Surgical History:  Past Surgical History:   Procedure Laterality Date   • BREAST RECONSTRUCTION  ,    implant   •  SECTION         Social History:  Social History     Tobacco Use   • Smoking status: Every Day     Packs/day: 0.50     Types: Cigarettes   • Smokeless tobacco: Never   Substance Use Topics   • Alcohol use: Yes     Comment: socially   • Drug use: Defer       Family History: She indicated that her biological mother is .      Allergies:No known allergies    Current Medications:    Outpatient Encounter Medications as of 2023:   •  GARLIC ORAL, Take 1 tablet by mouth daily.  •  multivitamin tablet, Take 1 tablet by mouth daily.  •  multivitamin with minerals (HAIR,SKIN AND NAILS ORAL), Take 1 tablet by mouth daily.  •  nebivoloL (BYSTOLIC) 5 mg tablet, TAKE 1 TABLET BY MOUTH EVERY DAY  •  multivitamin with minerals tablet, Take 1 tablet by mouth daily.                                                          OBJECTIVE   ROS as in HPI   Constitution: Negative for chills and fever.   Eyes: Negative for blurred vision and visual disturbance.  "  Cardiovascular: Negative for chest pain, dyspnea on exertion, near-syncope, palpitations and syncope.   Respiratory: Negative for hemoptysis, shortness of breath and wheezing.    Hematologic/Lymphatic: Negative for bleeding problem.   Skin: Negative for rash. No new skin changes  Gastrointestinal: Negative for abdominal pain, diarrhea, hematochezia, melena, nausea and vomiting.   Genitourinary: Negative for dysuria and hematuria.   Neurological: Negative for headaches.   She talked about protein shakes and other supplements she is using in hopes of improving the health of her hair       Vitals:    01/11/23 1132   BP: 110/80   BP Location: Left upper arm   Patient Position: Sitting   Pulse: 62   Resp: 16   Weight: 66.8 kg (147 lb 3.2 oz)   Height: 1.549 m (5' 0.98\")       BP Readings from Last 3 Encounters:   01/11/23 110/80   04/27/22 121/80   01/12/22 120/78     Wt Readings from Last 3 Encounters:   01/11/23 66.8 kg (147 lb 3.2 oz)   05/05/22 64.4 kg (142 lb)   04/27/22 64.4 kg (142 lb)       Physical Exam   Constitutional: Appears comfortable in no distress  HEENT:  Neck Supple.  No JVD No carotid bruits no cervical lymphadenopathy  Head: Normocephalic.   Eyes: Extraocular movements intact  Cardiovascular: Normal rate, regular rhythm no S3 gallop Exam reveals no friction rub.    Pulmonary/Chest: Effort normal and breath sounds normal. No wheezes.  Abdominal: Soft and nontender.  Musculoskeletal: No lower extremity edema. varicosities  Neurological: Alert and oriented to person, place, and time.   Skin: Skin is warm and dry.   Psychiatric: Behavior is normal.            Objective    No results found for: CHOL  No results found for: HDL  No results found for: LDLCALC  No results found for: TRIG  No results found for: ALT, AST  No results found for: WBC, HGB, HCT, PLT, INR  No results found for: GLUCOSE, NA, K, CO2, CL, BUN, CREATININE  No results found for: HGBA1C  No results found for: TSH  No results found for: " BNP  [unfilled]    Troponin I Results     No lab values to display.                                                             IMAGING   Transthoracic echocardiogram 1-  · Normal-sized LV. Estimated EF 65- 70%. Wall motion appears grossly normal. Normal LV wall thickness. Doppler evaluation does not suggest increased filling pressures.  · Normal leaflet structure and normal leaflet motion of the mitral valve.  · No significant mitral valve stenosis.  · Tricuspid aortic valve. A 0.7 cm faint mobile echodensity is noted in LVOT associated with aortic valve most likely consistent with degenerative material/Lamdls excrescence. Sclerotic aortic valve leaflets. No aortic valve regurgitation.  · Trace mitral valve regurgitation.  · Aortic root normal. No evidence of aortic coarctation by doppler.  · Pulmonic valve not well visualized. Trace pulmonic valve regurgitation.  · Tricuspid valve structure is normal. Mild tricuspid valve regurgitation.  · Estimated RVSP = 15 mmHg.  · Normal-sized RA.  · Normal-sized LA. Intact LA septum present.  · Normal-sized RV. Normal RV systolic function.  · IVC is a small caliber vessel (<1.7cm). IVC collapses >50% during inspiration.  · No evidence of pericardial effusion.       Exercise treadmill stress test 10-16-18  Normal stress EKG.  No significant arrhythmias or ischemic changes.  Normal blood pressure response.     EKG 1/11/2023   Sinus  Rhythm 62bpm [id number]  -Poor R-wave progression -nonspecific                                              ASSESSMENT AND PLAN     Ms. Estes is a 57-year-old woman with the following issues:  Assessment/Plan     Essential hypertension  Nebivolol 5 mg daily.  Lifestyle modification discussed at length as we always do in terms of exercise and diet.  Patient says she has gained weight over the holidays.    Hypercholesterolemia  Awaiting blood work which was ordered because she is trying to get term life insurance to assess repeat lipid profile.       Although her calculated 10-year risk of cardiovascular guidelines per ACC/AHA guidelines was borderline and not severely elevated, she has an increased LDL, at times has had elevated triglyceride levels, and is a smoker.  Her primary care physician had started atorvastatin but she stopped.  Did not have any particular symptoms just did not want to take the medication if she did not need to.     Recommendation was that benefit of medications outweigh risks.  She will try to work on changes in her diet and exercise and tobacco cessation.               Return in about 6 months (around 7/11/2023).       Thank you for allowing me to participate in the care of this patient.  I hope this information is helpful.         Elen Diaz MD [facility]  1/11/2023  12:25 PM    This document was generated utilizing voice recognition technology. A reasonable attempt at proofreading has been made to minimize errors but please excuse any typographical errors which may be present. Please call with any questions.

## 2023-01-11 NOTE — ASSESSMENT & PLAN NOTE
Awaiting blood work which was ordered because she is trying to get term life insurance to assess repeat lipid profile.      Although her calculated 10-year risk of cardiovascular guidelines per ACC/AHA guidelines was borderline and not severely elevated, she has an increased LDL, at times has had elevated triglyceride levels, and is a smoker.  Her primary care physician had started atorvastatin but she stopped.  Did not have any particular symptoms just did not want to take the medication if she did not need to.     Recommendation was that benefit of medications outweigh risks.  She will try to work on changes in her diet and exercise and tobacco cessation.

## 2023-05-08 DIAGNOSIS — I10 ESSENTIAL HYPERTENSION: ICD-10-CM

## 2023-05-08 RX ORDER — NEBIVOLOL 5 MG/1
TABLET ORAL
Qty: 90 TABLET | Refills: 1 | Status: SHIPPED | OUTPATIENT
Start: 2023-05-08 | End: 2023-10-25

## 2023-05-23 ENCOUNTER — APPOINTMENT (RX ONLY)
Dept: URBAN - METROPOLITAN AREA CLINIC 28 | Facility: CLINIC | Age: 58
Setting detail: DERMATOLOGY
End: 2023-05-23

## 2023-05-23 DIAGNOSIS — L91.8 OTHER HYPERTROPHIC DISORDERS OF THE SKIN: ICD-10-CM

## 2023-05-23 DIAGNOSIS — B07.8 OTHER VIRAL WARTS: ICD-10-CM

## 2023-05-23 PROCEDURE — 17110 DESTRUCTION B9 LES UP TO 14: CPT

## 2023-05-23 PROCEDURE — ? COUNSELING

## 2023-05-23 PROCEDURE — 11200 RMVL SKIN TAGS UP TO&INC 15: CPT | Mod: 59

## 2023-05-23 PROCEDURE — ? SKIN TAG REMOVAL

## 2023-05-23 PROCEDURE — ? BENIGN DESTRUCTION

## 2023-05-23 ASSESSMENT — LOCATION DETAILED DESCRIPTION DERM
LOCATION DETAILED: RIGHT ANTERIOR SHOULDER
LOCATION DETAILED: LEFT CENTRAL MALAR CHEEK
LOCATION DETAILED: LEFT INFERIOR LATERAL NECK
LOCATION DETAILED: RIGHT LATERAL CANTHUS
LOCATION DETAILED: RIGHT INFERIOR CENTRAL MALAR CHEEK
LOCATION DETAILED: RIGHT INFERIOR LATERAL NECK
LOCATION DETAILED: RIGHT INFERIOR ANTERIOR NECK
LOCATION DETAILED: RIGHT CLAVICULAR NECK

## 2023-05-23 ASSESSMENT — LOCATION ZONE DERM
LOCATION ZONE: FACE
LOCATION ZONE: EYELID
LOCATION ZONE: ARM
LOCATION ZONE: NECK

## 2023-05-23 ASSESSMENT — LOCATION SIMPLE DESCRIPTION DERM
LOCATION SIMPLE: RIGHT SHOULDER
LOCATION SIMPLE: RIGHT EYELID
LOCATION SIMPLE: RIGHT CHEEK
LOCATION SIMPLE: LEFT ANTERIOR NECK
LOCATION SIMPLE: RIGHT ANTERIOR NECK
LOCATION SIMPLE: LEFT CHEEK

## 2023-05-23 NOTE — PROCEDURE: SKIN TAG REMOVAL
Detail Level: Detailed
Medical Necessity Clause: This procedure was medically necessary because the lesions that were treated were:
Anesthesia Volume In Cc: 0
Anesthesia Type: 1% lidocaine with epinephrine
Medical Necessity Information: It is in your best interest to select a reason for this procedure from the list below. All of these items fulfill various CMS LCD requirements except the new and changing color options.
Consent: Written consent obtained and the risks of skin tag removal was reviewed with the patient including but not limited to bleeding, pigmentary change, infection, pain, and remote possibility of scarring.
Add Associated Diagnoses If Applicable When Selecting Medical Necessity: Yes
Include Z78.9 (Other Specified Conditions Influencing Health Status) As An Associated Diagnosis?: No

## 2023-05-23 NOTE — PROCEDURE: BENIGN DESTRUCTION
Treatment Number (Will Not Render If 0): 0
Post-Care Instructions: I reviewed with the patient in detail post-care instructions. Patient is to wear sunprotection, and avoid picking at any of the treated lesions. Pt may apply Vaseline to crusted or scabbing areas.
Medical Necessity Clause: This procedure was medically necessary because the lesions that were treated were:
Include Z78.9 (Other Specified Conditions Influencing Health Status) As An Associated Diagnosis?: No
Consent: The patient's consent was obtained including but not limited to risks of crusting, scabbing, blistering, scarring, darker or lighter pigmentary change, recurrence, incomplete removal and infection.
Medical Necessity Information: It is in your best interest to select a reason for this procedure from the list below. All of these items fulfill various CMS LCD requirements except the new and changing color options.
Detail Level: Detailed

## 2023-09-13 ENCOUNTER — OFFICE VISIT (OUTPATIENT)
Dept: CARDIOLOGY | Facility: CLINIC | Age: 58
End: 2023-09-13
Payer: COMMERCIAL

## 2023-09-13 VITALS
HEIGHT: 61 IN | BODY MASS INDEX: 27 KG/M2 | SYSTOLIC BLOOD PRESSURE: 126 MMHG | HEART RATE: 70 BPM | DIASTOLIC BLOOD PRESSURE: 80 MMHG | RESPIRATION RATE: 16 BRPM | WEIGHT: 143 LBS

## 2023-09-13 DIAGNOSIS — I10 ESSENTIAL HYPERTENSION: Primary | ICD-10-CM

## 2023-09-13 DIAGNOSIS — E78.00 HYPERCHOLESTEROLEMIA: ICD-10-CM

## 2023-09-13 PROCEDURE — 93000 ELECTROCARDIOGRAM COMPLETE: CPT | Performed by: INTERNAL MEDICINE

## 2023-09-13 PROCEDURE — 99214 OFFICE O/P EST MOD 30 MIN: CPT | Performed by: INTERNAL MEDICINE

## 2023-09-13 PROCEDURE — 3008F BODY MASS INDEX DOCD: CPT | Performed by: INTERNAL MEDICINE

## 2023-09-13 NOTE — PROGRESS NOTES
Cardiology Outpatient Note    [facility]    [facility] Office  [address] [place] [address]     [facility]  The Heart [address] Level  [address] [place] [address]     TEL  [phone]  [facility]/mlhc     Tammy Walker is a 57 y.o. female with history of hypertension and tobacco use.  She occasionally smokes cigarettes and attributes much of this to stress at her work.  She works at the [employer].  She has worked there for 38 years.  She retired in 2022.  She feels great and spending more time with loved ones.  She still smokes less than 14 cigarettes daily.  She says that she was smoking fewer than 8 cigarettes daily.  She is trying to do her best to stay healthy and wants to lose weight.    She is concerned about her circulation and by that she means the small spider veins that she has in her lower extremities and we discussed how they are cosmetic issues primarily.  In the past she has seen vascular surgery and ablation was offered but she did not wish to pursue that option.  She had an appointment with ophthalmology and had no remarkable findings.    Blood work  shows  HDL 41 triglycerides 248.    Echocardiogram showed no significant valvular disease and normal left ventricular function.  She denies any chest pain palpitations or shortness of breath.      She denies any acute illnesses or hospitalizations.   She had COVID 2022 but did not have major symptoms or require hospitalization.  She is vaccinated.                                                         [facility]     Medical History:  Past Medical History:   Diagnosis Date    Hypertension     Tobacco abuse        Surgical History:  Past Surgical History:   Procedure Laterality Date    BREAST RECONSTRUCTION  ,2009    implant     SECTION         Social History:  Social History     Tobacco Use    Smoking status: Every Day     Packs/day: 0.50  "    Types: Cigarettes    Smokeless tobacco: Never   Substance Use Topics    Alcohol use: Yes     Comment: socially    Drug use: Defer       Family History: She indicated that her biological mother is .      Allergies:No known allergies    Current Medications:    Outpatient Encounter Medications as of 2023:     GARLIC ORAL, Take 1 tablet by mouth daily.    multivitamin with minerals (HAIR,SKIN AND NAILS ORAL), Take 1 tablet by mouth daily.    multivitamin with minerals tablet, Take 1 tablet by mouth daily.    nebivoloL (BYSTOLIC) 5 mg tablet, TAKE 1 TABLET BY MOUTH EVERY DAY    [DISCONTINUED] multivitamin tablet, Take 1 tablet by mouth daily.                                                          OBJECTIVE   ROS as in HPI   Constitution: Negative for chills and fever.   Eyes: Negative for blurred vision and visual disturbance.   Cardiovascular: Negative for chest pain, dyspnea on exertion, near-syncope, palpitations and syncope.   Respiratory: Negative for hemoptysis, shortness of breath and wheezing.    Hematologic/Lymphatic: Negative for bleeding problem.   Skin: Negative for rash. No new skin changes  Gastrointestinal: Negative for abdominal pain, diarrhea, hematochezia, melena, nausea and vomiting.   Genitourinary: Negative for dysuria and hematuria.   Neurological: Negative for headaches.   She talked about protein shakes and other supplements she is using in hopes of improving the health of her hair       Vitals:    23 1544   BP: 126/80   BP Location: Left upper arm   Patient Position: Sitting   Pulse: 70   Resp: 16   Weight: 64.9 kg (143 lb)   Height: 1.549 m (5' 0.98\")       BP Readings from Last 3 Encounters:   23 126/80   23 110/80   22 121/80     Wt Readings from Last 3 Encounters:   23 64.9 kg (143 lb)   23 66.8 kg (147 lb 3.2 oz)   22 64.4 kg (142 lb)       Physical Exam   Constitutional: Appears comfortable in no distress  HEENT:  Neck " Supple.  No JVD No carotid bruits no cervical lymphadenopathy  Head: Normocephalic.   Eyes: Extraocular movements intact  Cardiovascular: Normal rate, regular rhythm no S3 gallop Exam reveals no friction rub.    Pulmonary/Chest: Effort normal and breath sounds normal. No wheezes.  Abdominal: Soft and nontender.  Musculoskeletal: No lower extremity edema.  Venous varicosities  Neurological: Alert and oriented to person, place, and time.   Skin: Skin is warm and dry.   Psychiatric: Behavior is normal.            Objective   No results found for: CHOL  No results found for: HDL  No results found for: LDLCALC  No results found for: TRIG  No results found for: ALT, AST  No results found for: WBC, HGB, HCT, PLT, INR  No results found for: GLUCOSE, NA, K, CO2, CL, BUN, CREATININE  No results found for: HGBA1C  No results found for: TSH  No results found for: BNP  [unfilled]    Troponin I Results     No lab values to display.                                                             IMAGING   Transthoracic echocardiogram 1-  · Normal-sized LV. Estimated EF 65- 70%. Wall motion appears grossly normal. Normal LV wall thickness. Doppler evaluation does not suggest increased filling pressures.  · Normal leaflet structure and normal leaflet motion of the mitral valve.  · No significant mitral valve stenosis.  · Tricuspid aortic valve. A 0.7 cm faint mobile echodensity is noted in LVOT associated with aortic valve most likely consistent with degenerative material/Lamdls excrescence. Sclerotic aortic valve leaflets. No aortic valve regurgitation.  · Trace mitral valve regurgitation.  · Aortic root normal. No evidence of aortic coarctation by doppler.  · Pulmonic valve not well visualized. Trace pulmonic valve regurgitation.  · Tricuspid valve structure is normal. Mild tricuspid valve regurgitation.  · Estimated RVSP = 15 mmHg.  · Normal-sized RA.  · Normal-sized LA. Intact LA septum present.  · Normal-sized RV. Normal RV  systolic function.  · IVC is a small caliber vessel (<1.7cm). IVC collapses >50% during inspiration.  · No evidence of pericardial effusion.       Exercise treadmill stress test 10-16-18  Normal stress EKG.  No significant arrhythmias or ischemic changes.  Normal blood pressure response.     EKG 9/13/2023   Sinus  Rhythm 70bpm [id number]  WITHIN NORMAL LIMITS                                             ASSESSMENT AND PLAN     Ms. Estes is a 57-year-old woman with the following issues:  Assessment/Plan    Essential hypertension  Nebivolol 5 mg daily    Hypercholesterolemia  Although her calculated 10-year risk of cardiovascular guidelines per ACC/AHA guidelines was borderline and not severely elevated, she has an increased LDL, at times has had elevated triglyceride levels, and is a smoker.  Blood work 2-2023  HDL 41 triglycerides 248.  Her primary care physician had started atorvastatin but she stopped.  Did not have any particular symptoms just did not want to take the medication if she did not need to.     Recommendation was that benefit of medications outweigh risks.  She will try to work on changes in her diet and exercise and tobacco cessation.  She does not want to take any statin medication at this time.                Return in about 6 months (around 3/13/2024).       Thank you for allowing me to participate in the care of this patient.  I hope this information is helpful.         Elen Diaz MD [facility]  9/13/2023  4:46 PM    This document was generated utilizing voice recognition technology. A reasonable attempt at proofreading has been made to minimize errors but please excuse any typographical errors which may be present. Please call with any questions.

## 2023-09-13 NOTE — LETTER
September 13, 2023     Artemio Julio MD  [address]  [address]  [facility] [address]    Patient: Tammy Walker  YOB: 1965  Date of Visit: 9/13/2023      Dear Dr. Julio:    Thank you for referring Tammy Walker to me for evaluation. Below are my notes for this consultation.    If you have questions, please do not hesitate to call me. I look forward to following your patient along with you.         Sincerely,        Elen Diaz MD        CC: No Recipients    Elen Diaz MD  9/13/2023  4:46 PM  Signed       Cardiology Outpatient Note    [facility] Office  [address] [place] [address]     [facility]  The [facility] Level  [address] [place] [address]     TEL  [phone]  [facility].[employer]/mlhc     Tammy Walker is a 57 y.o. female with history of hypertension and tobacco use.  She occasionally smokes cigarettes and attributes much of this to stress at her work.  She works at the [employer].  She has worked there for 38 years.  She retired in June 2022.  She feels great and spending more time with loved ones.  She still smokes less than 14 cigarettes daily.  She says that she was smoking fewer than 8 cigarettes daily.  She is trying to do her best to stay healthy and wants to lose weight.    She is concerned about her circulation and by that she means the small spider veins that she has in her lower extremities and we discussed how they are cosmetic issues primarily.  In the past she has seen vascular surgery and ablation was offered but she did not wish to pursue that option.  She had an appointment with ophthalmology and had no remarkable findings.    Blood work 2-2023 shows  HDL 41 triglycerides 248.    Echocardiogram showed no significant valvular disease and normal left ventricular function.  She denies any chest pain palpitations or shortness of breath.      She denies any acute illnesses or  hospitalizations.   She had COVID 2022 but did not have major symptoms or require hospitalization.  She is vaccinated.                                                         [facility]     Medical History:  Past Medical History:   Diagnosis Date    Hypertension     Tobacco abuse        Surgical History:  Past Surgical History:   Procedure Laterality Date    BREAST RECONSTRUCTION  ,    implant     SECTION         Social History:  Social History     Tobacco Use    Smoking status: Every Day     Packs/day: 0.50     Types: Cigarettes    Smokeless tobacco: Never   Substance Use Topics    Alcohol use: Yes     Comment: socially    Drug use: Defer       Family History: She indicated that her biological mother is .      Allergies:No known allergies    Current Medications:    Outpatient Encounter Medications as of 2023:     GARLIC ORAL, Take 1 tablet by mouth daily.    multivitamin with minerals (HAIR,SKIN AND NAILS ORAL), Take 1 tablet by mouth daily.    multivitamin with minerals tablet, Take 1 tablet by mouth daily.    nebivoloL (BYSTOLIC) 5 mg tablet, TAKE 1 TABLET BY MOUTH EVERY DAY    [DISCONTINUED] multivitamin tablet, Take 1 tablet by mouth daily.                                                          OBJECTIVE   ROS as in HPI   Constitution: Negative for chills and fever.   Eyes: Negative for blurred vision and visual disturbance.   Cardiovascular: Negative for chest pain, dyspnea on exertion, near-syncope, palpitations and syncope.   Respiratory: Negative for hemoptysis, shortness of breath and wheezing.    Hematologic/Lymphatic: Negative for bleeding problem.   Skin: Negative for rash. No new skin changes  Gastrointestinal: Negative for abdominal pain, diarrhea, hematochezia, melena, nausea and vomiting.   Genitourinary: Negative for dysuria and hematuria.   Neurological: Negative for headaches.   She talked about protein shakes and other supplements she is using in  "hopes of improving the health of her hair       Vitals:    09/13/23 1544   BP: 126/80   BP Location: Left upper arm   Patient Position: Sitting   Pulse: 70   Resp: 16   Weight: 64.9 kg (143 lb)   Height: 1.549 m (5' 0.98\")       BP Readings from Last 3 Encounters:   09/13/23 126/80   01/11/23 110/80   04/27/22 121/80     Wt Readings from Last 3 Encounters:   09/13/23 64.9 kg (143 lb)   01/11/23 66.8 kg (147 lb 3.2 oz)   05/05/22 64.4 kg (142 lb)       Physical Exam   Constitutional: Appears comfortable in no distress  HEENT:  Neck Supple.  No JVD No carotid bruits no cervical lymphadenopathy  Head: Normocephalic.   Eyes: Extraocular movements intact  Cardiovascular: Normal rate, regular rhythm no S3 gallop Exam reveals no friction rub.    Pulmonary/Chest: Effort normal and breath sounds normal. No wheezes.  Abdominal: Soft and nontender.  Musculoskeletal: No lower extremity edema.  Venous varicosities  Neurological: Alert and oriented to person, place, and time.   Skin: Skin is warm and dry.   Psychiatric: Behavior is normal.            Objective   No results found for: CHOL  No results found for: HDL  No results found for: LDLCALC  No results found for: TRIG  No results found for: ALT, AST  No results found for: WBC, HGB, HCT, PLT, INR  No results found for: GLUCOSE, NA, K, CO2, CL, BUN, CREATININE  No results found for: HGBA1C  No results found for: TSH  No results found for: BNP  [unfilled]    Troponin I Results     No lab values to display.                                                             IMAGING   Transthoracic echocardiogram 1-  · Normal-sized LV. Estimated EF 65- 70%. Wall motion appears grossly normal. Normal LV wall thickness. Doppler evaluation does not suggest increased filling pressures.  · Normal leaflet structure and normal leaflet motion of the mitral valve.  · No significant mitral valve stenosis.  · Tricuspid aortic valve. A 0.7 cm faint mobile echodensity is noted in LVOT associated " with aortic valve most likely consistent with degenerative material/Lamdls excrescence. Sclerotic aortic valve leaflets. No aortic valve regurgitation.  · Trace mitral valve regurgitation.  · Aortic root normal. No evidence of aortic coarctation by doppler.  · Pulmonic valve not well visualized. Trace pulmonic valve regurgitation.  · Tricuspid valve structure is normal. Mild tricuspid valve regurgitation.  · Estimated RVSP = 15 mmHg.  · Normal-sized RA.  · Normal-sized LA. Intact LA septum present.  · Normal-sized RV. Normal RV systolic function.  · IVC is a small caliber vessel (<1.7cm). IVC collapses >50% during inspiration.  · No evidence of pericardial effusion.       Exercise treadmill stress test 10-16-18  Normal stress EKG.  No significant arrhythmias or ischemic changes.  Normal blood pressure response.     EKG 9/13/2023   Sinus  Rhythm 70bpm [id number]  WITHIN NORMAL LIMITS                                             ASSESSMENT AND PLAN     Ms. Estes is a 57-year-old woman with the following issues:  Assessment/Plan    Essential hypertension  Nebivolol 5 mg daily    Hypercholesterolemia  Although her calculated 10-year risk of cardiovascular guidelines per ACC/AHA guidelines was borderline and not severely elevated, she has an increased LDL, at times has had elevated triglyceride levels, and is a smoker.  Blood work 2-2023  HDL 41 triglycerides 248.  Her primary care physician had started atorvastatin but she stopped.  Did not have any particular symptoms just did not want to take the medication if she did not need to.     Recommendation was that benefit of medications outweigh risks.  She will try to work on changes in her diet and exercise and tobacco cessation.  She does not want to take any statin medication at this time.               Return in about 6 months (around 3/13/2024).       Thank you for allowing me to participate in the care of this patient.  I hope this information is helpful.          Elen Diaz MD [facility]  9/13/2023  4:46 PM    This document was generated utilizing voice recognition technology. A reasonable attempt at proofreading has been made to minimize errors but please excuse any typographical errors which may be present. Please call with any questions.

## 2023-09-13 NOTE — ASSESSMENT & PLAN NOTE
Although her calculated 10-year risk of cardiovascular guidelines per ACC/AHA guidelines was borderline and not severely elevated, she has an increased LDL, at times has had elevated triglyceride levels, and is a smoker.  Blood work 2-2023  HDL 41 triglycerides 248.  Her primary care physician had started atorvastatin but she stopped.  Did not have any particular symptoms just did not want to take the medication if she did not need to.     Recommendation was that benefit of medications outweigh risks.  She will try to work on changes in her diet and exercise and tobacco cessation.  She does not want to take any statin medication at this time.

## 2023-10-25 DIAGNOSIS — I10 ESSENTIAL HYPERTENSION: ICD-10-CM

## 2023-10-25 RX ORDER — NEBIVOLOL 5 MG/1
TABLET ORAL
Qty: 90 TABLET | Refills: 3 | Status: SHIPPED | OUTPATIENT
Start: 2023-10-25 | End: 2024-07-23 | Stop reason: SDUPTHER

## 2024-03-13 ENCOUNTER — OFFICE VISIT (OUTPATIENT)
Dept: CARDIOLOGY | Facility: CLINIC | Age: 59
End: 2024-03-13
Payer: COMMERCIAL

## 2024-03-13 VITALS
SYSTOLIC BLOOD PRESSURE: 126 MMHG | WEIGHT: 147 LBS | DIASTOLIC BLOOD PRESSURE: 80 MMHG | BODY MASS INDEX: 27.75 KG/M2 | HEIGHT: 61 IN | RESPIRATION RATE: 16 BRPM | HEART RATE: 57 BPM

## 2024-03-13 DIAGNOSIS — E78.00 HYPERCHOLESTEROLEMIA: ICD-10-CM

## 2024-03-13 DIAGNOSIS — I10 ESSENTIAL HYPERTENSION: Primary | ICD-10-CM

## 2024-03-13 LAB
ATRIAL RATE: 57
P AXIS: 27
PR INTERVAL: 164
QRS DURATION: 74
QT INTERVAL: 382
QTC CALCULATION(BAZETT): 371
R AXIS: 70
T WAVE AXIS: 54
VENTRICULAR RATE: 57

## 2024-03-13 PROCEDURE — 3008F BODY MASS INDEX DOCD: CPT | Performed by: INTERNAL MEDICINE

## 2024-03-13 PROCEDURE — 93000 ELECTROCARDIOGRAM COMPLETE: CPT | Performed by: INTERNAL MEDICINE

## 2024-03-13 PROCEDURE — 99213 OFFICE O/P EST LOW 20 MIN: CPT | Performed by: INTERNAL MEDICINE

## 2024-03-13 NOTE — LETTER
2024     Artemio Julio MD  [address]  [address]  [facility] [address]    Patient: Tammy Walker  YOB: 1965  Date of Visit: 3/13/2024      Dear Dr. Julio:    Thank you for referring Tammy Walker to me for evaluation. Below are my notes for this consultation.    If you have questions, please do not hesitate to call me. I look forward to following your patient along with you.         Sincerely,        Elen Diaz MD        CC: No Recipients    Elen Diaz MD  3/13/2024 10:34 AM  Signed       Cardiology Outpatient Note    [facility]    [facility] Office  [address] [place] [address]     [facility]  [facility] Level  [address] [place] [address]     TEL  [phone]  [facility].[employer]/mlhc     Tammy Walker is a 58 y.o. female with history of hypertension and tobacco use.  She occasionally smokes cigarettes and attributes much of this to stress at her work.  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  She keeps herself busy spending time with family and friends and cooking dinner for her children.     No chest pain headaches shortness of breath coughing or fevers.  Multivitamins, and garlic, and other supplements she is taking because she is concerned about her thinning hair.  She occasionally smokes cigarettes.    No recent hospitalizations or illnesses.    Blood work  shows  HDL 41 triglycerides 248.                                                             [facility]     Medical History:  Past Medical History:   Diagnosis Date   • Hypertension    • Tobacco abuse        Surgical History:  Past Surgical History:   Procedure Laterality Date   • BREAST RECONSTRUCTION  ,    implant   •  SECTION         Social History:  Social History     Tobacco Use   • Smoking status: Every Day     Packs/day: .5     Types: Cigarettes   • Smokeless tobacco:  "Never   Substance Use Topics   • Alcohol use: Yes     Comment: socially   • Drug use: Defer       Family History: She indicated that her biological mother is .      Allergies:No known allergies    Current Medications:    Outpatient Encounter Medications as of 3/13/2024:   •  GARLIC ORAL, Take 1 tablet by mouth daily.  •  multivitamin with minerals tablet, Take 1 tablet by mouth daily.  •  mv,iron,mins/folic acid/biotin (HAIR FORMULA ORAL), Take 1 tablet by mouth every other day.  •  nebivoloL (BYSTOLIC) 5 mg tablet, TAKE 1 TABLET BY MOUTH EVERY DAY  •  multivitamin with minerals (HAIR,SKIN AND NAILS ORAL), Take 1 tablet by mouth daily.                                                          OBJECTIVE   ROS as in HPI   Constitution: Negative for chills and fever.   Eyes: Negative for blurred vision and visual disturbance.   Cardiovascular: Negative for chest pain, dyspnea on exertion, near-syncope, palpitations and syncope.   Respiratory: Negative for hemoptysis, shortness of breath and wheezing.    Hematologic/Lymphatic: Negative for bleeding problem.   Skin: Negative for rash. No new skin changes  Gastrointestinal: Negative for abdominal pain, diarrhea, hematochezia, melena, nausea and vomiting.   Genitourinary: Negative for dysuria and hematuria.   Neurological: Negative for headaches.          Vitals:    24 1001   BP: 126/80   BP Location: Left upper arm   Patient Position: Sitting   Pulse: (!) 57   Resp: 16   Weight: 66.7 kg (147 lb)   Height: 1.549 m (5' 0.98\")       BP Readings from Last 3 Encounters:   24 126/80   23 126/80   23 110/80     Wt Readings from Last 3 Encounters:   24 66.7 kg (147 lb)   23 64.9 kg (143 lb)   23 66.8 kg (147 lb 3.2 oz)       Physical Exam   Constitutional: Appears comfortable in no distress  HEENT:  Neck Supple.  No JVD No carotid bruits no cervical lymphadenopathy  Head: Normocephalic.   Eyes: Extraocular movements " "intact  Cardiovascular: Normal rate, regular rhythm no S3 gallop Exam reveals no friction rub.    Pulmonary/Chest: Effort normal and breath sounds normal. No wheezes.  Abdominal: Soft and nontender.  Musculoskeletal: No lower extremity edema.  Venous varicosities  Neurological: Alert and oriented to person, place, and time.   Skin: Skin is warm and dry.   Psychiatric: Behavior is normal.            Objective   No results found for: \"CHOL\"  No results found for: \"HDL\"  No results found for: \"LDLCALC\"  No results found for: \"TRIG\"  No results found for: \"ALT\", \"AST\"  No results found for: \"WBC\", \"HGB\", \"HCT\", \"PLT\", \"INR\"  No results found for: \"GLUCOSE\", \"NA\", \"K\", \"CO2\", \"CL\", \"BUN\", \"CREATININE\"  No results found for: \"HGBA1C\"  No results found for: \"TSH\"  No results found for: \"BNP\"  [unfilled]    Troponin I Results     No lab values to display.                                                             IMAGING   Transthoracic echocardiogram 1-  · Normal-sized LV. Estimated EF 65- 70%. Wall motion appears grossly normal. Normal LV wall thickness. Doppler evaluation does not suggest increased filling pressures.  · Normal leaflet structure and normal leaflet motion of the mitral valve.  · No significant mitral valve stenosis.  · Tricuspid aortic valve. A 0.7 cm faint mobile echodensity is noted in LVOT associated with aortic valve most likely consistent with degenerative material/Lamdls excrescence. Sclerotic aortic valve leaflets. No aortic valve regurgitation.  · Trace mitral valve regurgitation.  · Aortic root normal. No evidence of aortic coarctation by doppler.  · Pulmonic valve not well visualized. Trace pulmonic valve regurgitation.  · Tricuspid valve structure is normal. Mild tricuspid valve regurgitation.  · Estimated RVSP = 15 mmHg.  · Normal-sized RA.  · Normal-sized LA. Intact LA septum present.  · Normal-sized RV. Normal RV systolic function.  · IVC is a small caliber vessel (<1.7cm). IVC " collapses >50% during inspiration.  · No evidence of pericardial effusion.       Exercise treadmill stress test 10-16-18  Normal stress EKG.  No significant arrhythmias or ischemic changes.  Normal blood pressure response.     EKG 3/13/2024   Sinus bradycardia 57bpm [id number]  Septal infarct , age undetermined  Abnormal ECG  No previous ECGs available                                                  ASSESSMENT AND PLAN     Ms. Estes is a 58-year-old woman with the following issues:  Assessment/Plan    Essential hypertension  Nebivolol 5 mg daily    Hypercholesterolemia  Although her calculated 10-year risk of cardiovascular guidelines per ACC/AHA guidelines was borderline and not severely elevated, she has an increased LDL, at times has had elevated triglyceride levels, and is a smoker.  Blood work 2-2023  HDL 41 triglycerides 248.  Her primary care physician had started atorvastatin but she stopped.  She says under no circumstances will she take statin medication     Recommendation was that benefit of medications outweigh risks.  She will try to work on changes in her diet and exercise and tobacco cessation.                 Return in about 6 months (around 9/13/2024).       Thank you for allowing me to participate in the care of this patient.  I hope this information is helpful.         Elen Diaz MD [facility]  3/13/2024  10:33 AM    This document was generated utilizing voice recognition technology. A reasonable attempt at proofreading has been made to minimize errors but please excuse any typographical errors which may be present. Please call with any questions.

## 2024-03-13 NOTE — PROGRESS NOTES
Cardiology Outpatient Note    [facility]    [facility] Office  [address] [place] [address]     [facility]  [facility] Level  [address] [place] [address]     TEL  [phone]  [facility]/mlhc     Tammy Walker is a 58 y.o. female with history of hypertension and tobacco use.  She occasionally smokes cigarettes and attributes much of this to stress at her work.  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  She keeps herself busy spending time with family and friends and cooking dinner for her children.     No chest pain headaches shortness of breath coughing or fevers.  Multivitamins, and garlic, and other supplements she is taking because she is concerned about her thinning hair.  She occasionally smokes cigarettes.    No recent hospitalizations or illnesses.    Blood work  shows  HDL 41 triglycerides 248.                                                             PMH     Medical History:  Past Medical History:   Diagnosis Date   • Hypertension    • Tobacco abuse        Surgical History:  Past Surgical History:   Procedure Laterality Date   • BREAST RECONSTRUCTION  ,    implant   •  SECTION         Social History:  Social History     Tobacco Use   • Smoking status: Every Day     Packs/day: .5     Types: Cigarettes   • Smokeless tobacco: Never   Substance Use Topics   • Alcohol use: Yes     Comment: socially   • Drug use: Defer       Family History: She indicated that her biological mother is .      Allergies:No known allergies    Current Medications:    Outpatient Encounter Medications as of 3/13/2024:   •  GARLIC ORAL, Take 1 tablet by mouth daily.  •  multivitamin with minerals tablet, Take 1 tablet by mouth daily.  •  mv,iron,mins/folic acid/biotin (HAIR FORMULA ORAL), Take 1 tablet by mouth every other day.  •  nebivoloL (BYSTOLIC) 5 mg  "tablet, TAKE 1 TABLET BY MOUTH EVERY DAY  •  multivitamin with minerals (HAIR,SKIN AND NAILS ORAL), Take 1 tablet by mouth daily.                                                          OBJECTIVE   ROS as in HPI   Constitution: Negative for chills and fever.   Eyes: Negative for blurred vision and visual disturbance.   Cardiovascular: Negative for chest pain, dyspnea on exertion, near-syncope, palpitations and syncope.   Respiratory: Negative for hemoptysis, shortness of breath and wheezing.    Hematologic/Lymphatic: Negative for bleeding problem.   Skin: Negative for rash. No new skin changes  Gastrointestinal: Negative for abdominal pain, diarrhea, hematochezia, melena, nausea and vomiting.   Genitourinary: Negative for dysuria and hematuria.   Neurological: Negative for headaches.          Vitals:    03/13/24 1001   BP: 126/80   BP Location: Left upper arm   Patient Position: Sitting   Pulse: (!) 57   Resp: 16   Weight: 66.7 kg (147 lb)   Height: 1.549 m (5' 0.98\")       BP Readings from Last 3 Encounters:   03/13/24 126/80   09/13/23 126/80   01/11/23 110/80     Wt Readings from Last 3 Encounters:   03/13/24 66.7 kg (147 lb)   09/13/23 64.9 kg (143 lb)   01/11/23 66.8 kg (147 lb 3.2 oz)       Physical Exam   Constitutional: Appears comfortable in no distress  HEENT:  Neck Supple.  No JVD No carotid bruits no cervical lymphadenopathy  Head: Normocephalic.   Eyes: Extraocular movements intact  Cardiovascular: Normal rate, regular rhythm no S3 gallop Exam reveals no friction rub.    Pulmonary/Chest: Effort normal and breath sounds normal. No wheezes.  Abdominal: Soft and nontender.  Musculoskeletal: No lower extremity edema.  Venous varicosities  Neurological: Alert and oriented to person, place, and time.   Skin: Skin is warm and dry.   Psychiatric: Behavior is normal.            Objective   No results found for: \"CHOL\"  No results found for: \"HDL\"  No results found for: \"LDLCALC\"  No results found for: " "\"TRIG\"  No results found for: \"ALT\", \"AST\"  No results found for: \"WBC\", \"HGB\", \"HCT\", \"PLT\", \"INR\"  No results found for: \"GLUCOSE\", \"NA\", \"K\", \"CO2\", \"CL\", \"BUN\", \"CREATININE\"  No results found for: \"HGBA1C\"  No results found for: \"TSH\"  No results found for: \"BNP\"  [unfilled]    Troponin I Results     No lab values to display.                                                             IMAGING   Transthoracic echocardiogram 1-  · Normal-sized LV. Estimated EF 65- 70%. Wall motion appears grossly normal. Normal LV wall thickness. Doppler evaluation does not suggest increased filling pressures.  · Normal leaflet structure and normal leaflet motion of the mitral valve.  · No significant mitral valve stenosis.  · Tricuspid aortic valve. A 0.7 cm faint mobile echodensity is noted in LVOT associated with aortic valve most likely consistent with degenerative material/Lamdls excrescence. Sclerotic aortic valve leaflets. No aortic valve regurgitation.  · Trace mitral valve regurgitation.  · Aortic root normal. No evidence of aortic coarctation by doppler.  · Pulmonic valve not well visualized. Trace pulmonic valve regurgitation.  · Tricuspid valve structure is normal. Mild tricuspid valve regurgitation.  · Estimated RVSP = 15 mmHg.  · Normal-sized RA.  · Normal-sized LA. Intact LA septum present.  · Normal-sized RV. Normal RV systolic function.  · IVC is a small caliber vessel (<1.7cm). IVC collapses >50% during inspiration.  · No evidence of pericardial effusion.       Exercise treadmill stress test 10-16-18  Normal stress EKG.  No significant arrhythmias or ischemic changes.  Normal blood pressure response.     EKG 3/13/2024   Sinus bradycardia 57bpm [id number]  Septal infarct , age undetermined  Abnormal ECG  No previous ECGs available                                                  ASSESSMENT AND PLAN     Ms. Estes is a 58-year-old woman with the following issues:  Assessment/Plan    Essential " hypertension  Nebivolol 5 mg daily    Hypercholesterolemia  Although her calculated 10-year risk of cardiovascular guidelines per ACC/AHA guidelines was borderline and not severely elevated, she has an increased LDL, at times has had elevated triglyceride levels, and is a smoker.  Blood work 2-2023  HDL 41 triglycerides 248.  Her primary care physician had started atorvastatin but she stopped.  She says under no circumstances will she take statin medication     Recommendation was that benefit of medications outweigh risks.  She will try to work on changes in her diet and exercise and tobacco cessation.                  Return in about 6 months (around 9/13/2024).       Thank you for allowing me to participate in the care of this patient.  I hope this information is helpful.         Elen Diaz MD [facility]  3/13/2024  10:33 AM    This document was generated utilizing voice recognition technology. A reasonable attempt at proofreading has been made to minimize errors but please excuse any typographical errors which may be present. Please call with any questions.

## 2024-03-13 NOTE — ASSESSMENT & PLAN NOTE
Although her calculated 10-year risk of cardiovascular guidelines per ACC/AHA guidelines was borderline and not severely elevated, she has an increased LDL, at times has had elevated triglyceride levels, and is a smoker.  Blood work 2-2023  HDL 41 triglycerides 248.  Her primary care physician had started atorvastatin but she stopped.  She says under no circumstances will she take statin medication     Recommendation was that benefit of medications outweigh risks.  She will try to work on changes in her diet and exercise and tobacco cessation.

## 2024-07-23 ENCOUNTER — TELEPHONE (OUTPATIENT)
Dept: CARDIOLOGY | Facility: CLINIC | Age: 59
End: 2024-07-23
Payer: COMMERCIAL

## 2024-07-23 DIAGNOSIS — I10 ESSENTIAL HYPERTENSION: ICD-10-CM

## 2024-07-23 RX ORDER — NEBIVOLOL 5 MG/1
5 TABLET ORAL DAILY
Qty: 90 TABLET | Refills: 3 | Status: SHIPPED | OUTPATIENT
Start: 2024-07-23

## 2024-07-23 NOTE — TELEPHONE ENCOUNTER
Attempted to call patient and was unable to leave message due to unidentifiable voice mail message.  Sent script for Bystolic 5 mg to [facility] on [address].

## 2024-07-23 NOTE — TELEPHONE ENCOUNTER
Patient walked in, denied refill on Bystolic 5mg   Pharmacy on file is correct   Can you please look into this and follow up with patient

## 2024-09-18 ENCOUNTER — OFFICE VISIT (OUTPATIENT)
Dept: CARDIOLOGY | Facility: CLINIC | Age: 59
End: 2024-09-18
Payer: COMMERCIAL

## 2024-09-18 VITALS
HEART RATE: 65 BPM | DIASTOLIC BLOOD PRESSURE: 70 MMHG | RESPIRATION RATE: 18 BRPM | SYSTOLIC BLOOD PRESSURE: 124 MMHG | HEIGHT: 60 IN | BODY MASS INDEX: 28.47 KG/M2 | WEIGHT: 145 LBS

## 2024-09-18 DIAGNOSIS — I10 ESSENTIAL HYPERTENSION: ICD-10-CM

## 2024-09-18 DIAGNOSIS — R07.9 CHEST PAIN, UNSPECIFIED TYPE: Primary | ICD-10-CM

## 2024-09-18 DIAGNOSIS — E78.00 HYPERCHOLESTEROLEMIA: ICD-10-CM

## 2024-09-18 LAB
ATRIAL RATE: 65
P AXIS: 38
PR INTERVAL: 174
QRS DURATION: 80
QT INTERVAL: 378
QTC CALCULATION(BAZETT): 393
R AXIS: 64
T WAVE AXIS: 42
VENTRICULAR RATE: 65

## 2024-09-18 PROCEDURE — 3008F BODY MASS INDEX DOCD: CPT | Performed by: INTERNAL MEDICINE

## 2024-09-18 PROCEDURE — 93000 ELECTROCARDIOGRAM COMPLETE: CPT | Performed by: INTERNAL MEDICINE

## 2024-09-18 PROCEDURE — 99214 OFFICE O/P EST MOD 30 MIN: CPT | Performed by: INTERNAL MEDICINE

## 2024-09-18 RX ORDER — AMMONIUM LACTATE 12 G/100G
LOTION TOPICAL
COMMUNITY
Start: 2024-09-15

## 2024-09-18 NOTE — ASSESSMENT & PLAN NOTE
Although her calculated 10-year risk of cardiovascular guidelines per ACC/AHA guidelines was borderline and not severely elevated, she has an increased LDL, at times has had elevated triglyceride levels, and is a smoker.  Blood work 2-2023  HDL 41 triglycerides 248.  Her primary care physician had started atorvastatin but she stopped.  She says under no circumstances will she take statin medication     Recommendation was that benefit of medications outweigh risks.  She will try to work on changes in her diet and exercise and tobacco cessation.      We discussed this again today.  She did not have blood work drawn as asked during her previous visit.  She was given prescriptions again.  We also discussed additional possibilities for testing to look for premature coronary artery disease such as calcium scans or PET CT cardiac scan for evaluation of coronary calcification.  Patient says she understands this but does not wish to have any testing at this time and wishes to work on lifestyle.

## 2024-09-18 NOTE — PROGRESS NOTES
"     Cardiology Outpatient Note    [facility]    [facility]  [address] [place] [address]     [facility]  [facility] [address]  [address] [place] [address]     TEL  [phone]  [facility]/mlhc     Tammy Walker is a 58 y.o. female with history of hypertension and tobacco use.  She occasionally smokes cigarettes and attributes much of this to stress at her work.  She now smokes 12 to 14 cigarettes daily.  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  She says that she has not been great in terms of her diet citing apple pie, lemon Merengue pie, and black cherry ice cream as things she enjoys eating after work when watching TV.  She drinks 2 large cups of coffee daily.  She also enjoys marcelle lemon water and used to boil celery but now stopped boiling celery.    No chest pain headaches shortness of breath coughing or fevers.  No recent hospitalizations.  She says she has been taking nebivolol 5 mg daily and she \"feels it\" when she does not take the medication.  No recent illnesses or COVID infections.    Blood work  shows  HDL 41 triglycerides 248.    She initially asks about her lungs.  She is not having any coughing fevers or shortness of breath.  She goes to the gym maybe once per week but has not gone in the last month.  She does not have any symptoms with activity but realizes that she is continuing to smoke                                                         PMH     Medical History:  Past Medical History:   Diagnosis Date    Hypertension     Tobacco abuse        Surgical History:  Past Surgical History   Procedure Laterality Date    Breast reconstruction  ,    implant     section         Social History:  Social History     Tobacco Use    Smoking status: Every Day     Current packs/day: 0.50     Types: Cigarettes    Smokeless tobacco: Never   Substance Use " Topics    Alcohol use: Yes     Comment: socially    Drug use: Defer       Family History: She indicated that her biological mother is .      Allergies:No known allergies    Current Medications:    Outpatient Encounter Medications as of 2024:     ammonium lactate (LAC-HYDRIN) 12 % lotion,     GARLIC ORAL, Take 1 tablet by mouth daily.    multivitamin with minerals (HAIR,SKIN AND NAILS ORAL), Take 1 tablet by mouth daily.    multivitamin with minerals tablet, Take 1 tablet by mouth daily.    mv,iron,mins/folic acid/biotin (HAIR FORMULA ORAL), Take 1 tablet by mouth every other day.    nebivoloL (BYSTOLIC) 5 mg tablet, Take 1 tablet (5 mg total) by mouth daily.                                                          OBJECTIVE   ROS as in HPI   Constitution: Negative for chills and fever.   Eyes: Negative for blurred vision and visual disturbance.   Cardiovascular: Negative for chest pain, dyspnea on exertion, near-syncope, palpitations and syncope.   Respiratory: Negative for hemoptysis, shortness of breath and wheezing.    Hematologic/Lymphatic: Negative for bleeding problem.   Skin: Negative for rash. No new skin changes  Gastrointestinal: Negative for abdominal pain, diarrhea, hematochezia, melena, nausea and vomiting.   Genitourinary: Negative for dysuria and hematuria.   Neurological: Negative for headaches.          Vitals:    24 0940   BP: 124/70   BP Location: Left upper arm   Patient Position: Sitting   Pulse: 65   Resp: 18   Weight: 65.8 kg (145 lb)   Height: 1.524 m (5')       BP Readings from Last 3 Encounters:   24 124/70   24 126/80   23 126/80     Wt Readings from Last 3 Encounters:   24 65.8 kg (145 lb)   24 66.7 kg (147 lb)   23 64.9 kg (143 lb)       Physical Exam   Constitutional: Appears comfortable in no distress  HEENT:  Neck Supple.  No JVD No carotid bruits no cervical lymphadenopathy  Head: Normocephalic.   Eyes: Extraocular movements  "intact  Cardiovascular: Normal rate, regular rhythm no S3 gallop Exam reveals no friction rub.    Pulmonary/Chest: Effort normal decreased breath sounds at bases no wheezes.  Abdominal: Soft and nontender.  Musculoskeletal: No lower extremity edema.    Neurological: Alert and oriented to person, place, and time.   Skin: Skin is warm and dry.   Psychiatric: Behavior is normal.            Objective   No results found for: \"CHOL\"  No results found for: \"HDL\"  No results found for: \"LDLCALC\"  No results found for: \"TRIG\"  No results found for: \"ALT\", \"AST\"  No results found for: \"WBC\", \"HGB\", \"HCT\", \"PLT\", \"INR\"  No results found for: \"GLUCOSE\", \"NA\", \"K\", \"CO2\", \"CL\", \"BUN\", \"CREATININE\"  No results found for: \"HGBA1C\"  No results found for: \"TSH\"  No results found for: \"BNP\"  [unfilled]    Troponin I Results       No lab values to display.                                                               IMAGING   Transthoracic echocardiogram 1-  Normal-sized LV. Estimated EF 65- 70%. Wall motion appears grossly normal. Normal LV wall thickness. Doppler evaluation does not suggest increased filling pressures.  Normal leaflet structure and normal leaflet motion of the mitral valve.  No significant mitral valve stenosis.  Tricuspid aortic valve. A 0.7 cm faint mobile echodensity is noted in LVOT associated with aortic valve most likely consistent with degenerative material/Lamdls excrescence. Sclerotic aortic valve leaflets. No aortic valve regurgitation.  Trace mitral valve regurgitation.  Aortic root normal. No evidence of aortic coarctation by doppler.  Pulmonic valve not well visualized. Trace pulmonic valve regurgitation.  Tricuspid valve structure is normal. Mild tricuspid valve regurgitation.  Estimated RVSP = 15 mmHg.  Normal-sized RA.  Normal-sized LA. Intact LA septum present.  Normal-sized RV. Normal RV systolic function.  IVC is a small caliber vessel (<1.7cm). IVC collapses >50% during inspiration.  No " evidence of pericardial effusion.       Exercise treadmill stress test 10-16-18  Normal stress EKG.  No significant arrhythmias or ischemic changes.  Normal blood pressure response.     EKG 9/18/2024   Sinus rhythm with rare Premature ventricular complexes 65bpm  [id number]  Septal infarct (cited on or before 13-MAR-2024)  Abnormal ECG                                              ASSESSMENT AND PLAN     Ms. Estes is a 58-year-old woman with the following issues:  Assessment/Plan    Essential hypertension  Nebivolol 5 mg daily    Hypercholesterolemia  Although her calculated 10-year risk of cardiovascular guidelines per ACC/AHA guidelines was borderline and not severely elevated, she has an increased LDL, at times has had elevated triglyceride levels, and is a smoker.  Blood work 2-2023  HDL 41 triglycerides 248.  Her primary care physician had started atorvastatin but she stopped.  She says under no circumstances will she take statin medication     Recommendation was that benefit of medications outweigh risks.  She will try to work on changes in her diet and exercise and tobacco cessation.      We discussed this again today.  She did not have blood work drawn as asked during her previous visit.  She was given prescriptions again.  We also discussed additional possibilities for testing to look for premature coronary artery disease such as calcium scans or PET CT cardiac scan for evaluation of coronary calcification.  Patient says she understands this but does not wish to have any testing at this time and wishes to work on lifestyle.        Patient will follow-up with her primary care physician in terms of pulmonary testing and lung CT scans in light of her tobacco history.  She says she is not inclined to do any additional testing at this time but will discuss further with her primary care physician          Return in about 6 months (around 3/18/2025).       Thank you for allowing me to participate in the care  of this patient.  I hope this information is helpful.         Elen Diaz MD [facility]  9/18/2024  10:19 AM    This document was generated utilizing voice recognition technology. A reasonable attempt at proofreading has been made to minimize errors but please excuse any typographical errors which may be present. Please call with any questions.

## 2024-09-18 NOTE — LETTER
"September 18, 2024     Artemio Julio MD  [address]  [address]  [place] [address]    Patient: Tammy Walker  YOB: 1965  Date of Visit: 9/18/2024      Dear Dr. Julio:    Thank you for referring Tammy Walker to me for evaluation. Below are my notes for this consultation.    If you have questions, please do not hesitate to call me. I look forward to following your patient along with you.         Sincerely,        Elen Diaz MD        CC: No Recipients    Elen Diaz MD  9/18/2024 10:19 AM  Sign when Signing Visit       Cardiology Outpatient Note    [facility]  [address] [place] [address]     [facility]  [facility]  [address] [place] [address]     TEL  [phone]  [facility].[employer]/mlhc     Tammy Walker is a 58 y.o. female with history of hypertension and tobacco use.  She occasionally smokes cigarettes and attributes much of this to stress at her work.  She now smokes 12 to 14 cigarettes daily.  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  She says that she has not been great in terms of her diet citing apple pie, lemon Merengue pie, and black cherry ice cream as things she enjoys eating after work when watching TV.  She drinks 2 large cups of coffee daily.  She also enjoys marcelle lemon water and used to boil celery but now stopped boiling celery.    No chest pain headaches shortness of breath coughing or fevers.  No recent hospitalizations.  She says she has been taking nebivolol 5 mg daily and she \"feels it\" when she does not take the medication.  No recent illnesses or COVID infections.    Blood work 2-2023 shows  HDL 41 triglycerides 248.    She initially asks about her lungs.  She is not having any coughing fevers or shortness of breath.  She goes to the gym maybe once per week but has not gone in the last month.  She does not have any " symptoms with activity but realizes that she is continuing to smoke                                                         PMH     Medical History:  Past Medical History:   Diagnosis Date   • Hypertension    • Tobacco abuse        Surgical History:  Past Surgical History   Procedure Laterality Date   • Breast reconstruction  ,    implant   •  section         Social History:  Social History     Tobacco Use   • Smoking status: Every Day     Current packs/day: 0.50     Types: Cigarettes   • Smokeless tobacco: Never   Substance Use Topics   • Alcohol use: Yes     Comment: socially   • Drug use: Defer       Family History: She indicated that her biological mother is .      Allergies:No known allergies    Current Medications:    Outpatient Encounter Medications as of 2024:   •  ammonium lactate (LAC-HYDRIN) 12 % lotion,   •  GARLIC ORAL, Take 1 tablet by mouth daily.  •  multivitamin with minerals (HAIR,SKIN AND NAILS ORAL), Take 1 tablet by mouth daily.  •  multivitamin with minerals tablet, Take 1 tablet by mouth daily.  •  mv,iron,mins/folic acid/biotin (HAIR FORMULA ORAL), Take 1 tablet by mouth every other day.  •  nebivoloL (BYSTOLIC) 5 mg tablet, Take 1 tablet (5 mg total) by mouth daily.                                                          OBJECTIVE   ROS as in HPI   Constitution: Negative for chills and fever.   Eyes: Negative for blurred vision and visual disturbance.   Cardiovascular: Negative for chest pain, dyspnea on exertion, near-syncope, palpitations and syncope.   Respiratory: Negative for hemoptysis, shortness of breath and wheezing.    Hematologic/Lymphatic: Negative for bleeding problem.   Skin: Negative for rash. No new skin changes  Gastrointestinal: Negative for abdominal pain, diarrhea, hematochezia, melena, nausea and vomiting.   Genitourinary: Negative for dysuria and hematuria.   Neurological: Negative for headaches.          Vitals:    24 0940   BP:  "124/70   BP Location: Left upper arm   Patient Position: Sitting   Pulse: 65   Resp: 18   Weight: 65.8 kg (145 lb)   Height: 1.524 m (5')       BP Readings from Last 3 Encounters:   09/18/24 124/70   03/13/24 126/80   09/13/23 126/80     Wt Readings from Last 3 Encounters:   09/18/24 65.8 kg (145 lb)   03/13/24 66.7 kg (147 lb)   09/13/23 64.9 kg (143 lb)       Physical Exam   Constitutional: Appears comfortable in no distress  HEENT:  Neck Supple.  No JVD No carotid bruits no cervical lymphadenopathy  Head: Normocephalic.   Eyes: Extraocular movements intact  Cardiovascular: Normal rate, regular rhythm no S3 gallop Exam reveals no friction rub.    Pulmonary/Chest: Effort normal decreased breath sounds at bases no wheezes.  Abdominal: Soft and nontender.  Musculoskeletal: No lower extremity edema.    Neurological: Alert and oriented to person, place, and time.   Skin: Skin is warm and dry.   Psychiatric: Behavior is normal.            Objective   No results found for: \"CHOL\"  No results found for: \"HDL\"  No results found for: \"LDLCALC\"  No results found for: \"TRIG\"  No results found for: \"ALT\", \"AST\"  No results found for: \"WBC\", \"HGB\", \"HCT\", \"PLT\", \"INR\"  No results found for: \"GLUCOSE\", \"NA\", \"K\", \"CO2\", \"CL\", \"BUN\", \"CREATININE\"  No results found for: \"HGBA1C\"  No results found for: \"TSH\"  No results found for: \"BNP\"  [unfilled]    Troponin I Results       No lab values to display.                                                               IMAGING   Transthoracic echocardiogram 1-  Normal-sized LV. Estimated EF 65- 70%. Wall motion appears grossly normal. Normal LV wall thickness. Doppler evaluation does not suggest increased filling pressures.  Normal leaflet structure and normal leaflet motion of the mitral valve.  No significant mitral valve stenosis.  Tricuspid aortic valve. A 0.7 cm faint mobile echodensity is noted in LVOT associated with aortic valve most likely consistent with degenerative " material/Lamdls excrescence. Sclerotic aortic valve leaflets. No aortic valve regurgitation.  Trace mitral valve regurgitation.  Aortic root normal. No evidence of aortic coarctation by doppler.  Pulmonic valve not well visualized. Trace pulmonic valve regurgitation.  Tricuspid valve structure is normal. Mild tricuspid valve regurgitation.  Estimated RVSP = 15 mmHg.  Normal-sized RA.  Normal-sized LA. Intact LA septum present.  Normal-sized RV. Normal RV systolic function.  IVC is a small caliber vessel (<1.7cm). IVC collapses >50% during inspiration.  No evidence of pericardial effusion.       Exercise treadmill stress test 10-16-18  Normal stress EKG.  No significant arrhythmias or ischemic changes.  Normal blood pressure response.     EKG 9/18/2024   Sinus rhythm with rare Premature ventricular complexes 65bpm  [id number]  Septal infarct (cited on or before 13-MAR-2024)  Abnormal ECG                                              ASSESSMENT AND PLAN     Ms. Estes is a 58-year-old woman with the following issues:  Assessment/Plan    Essential hypertension  Nebivolol 5 mg daily    Hypercholesterolemia  Although her calculated 10-year risk of cardiovascular guidelines per ACC/AHA guidelines was borderline and not severely elevated, she has an increased LDL, at times has had elevated triglyceride levels, and is a smoker.  Blood work 2-2023  HDL 41 triglycerides 248.  Her primary care physician had started atorvastatin but she stopped.  She says under no circumstances will she take statin medication     Recommendation was that benefit of medications outweigh risks.  She will try to work on changes in her diet and exercise and tobacco cessation.      We discussed this again today.  She did not have blood work drawn as asked during her previous visit.  She was given prescriptions again.  We also discussed additional possibilities for testing to look for premature coronary artery disease such as calcium scans or PET  CT cardiac scan for evaluation of coronary calcification.  Patient says she understands this but does not wish to have any testing at this time and wishes to work on lifestyle.        Patient will follow-up with her primary care physician in terms of pulmonary testing and lung CT scans in light of her tobacco history.  She says she is not inclined to do any additional testing at this time but will discuss further with her primary care physician          Return in about 6 months (around 3/18/2025).       Thank you for allowing me to participate in the care of this patient.  I hope this information is helpful.         Elen Diaz MD [facility]  9/18/2024  10:19 AM    This document was generated utilizing voice recognition technology. A reasonable attempt at proofreading has been made to minimize errors but please excuse any typographical errors which may be present. Please call with any questions.

## 2024-11-20 LAB
ALT SERPL-CCNC: 26 IU/L (ref 0–32)
AST SERPL-CCNC: 24 IU/L (ref 0–40)
CHOLEST SERPL-MCNC: 267 MG/DL (ref 100–199)
HDLC SERPL-MCNC: 54 MG/DL
LC LDL CALC COMMENT: ABNORMAL
LDLC SERPL CALC-MCNC: 191 MG/DL (ref 0–99)
TRIGL SERPL-MCNC: 121 MG/DL (ref 0–149)
VLDLC SERPL CALC-MCNC: 22 MG/DL (ref 5–40)

## 2024-11-21 NOTE — RESULT ENCOUNTER NOTE
Spoke with patient. She verbalized understanding that her cholesterol is elevated. She does not wish to start cholesterol medication. She has a PCP julian with Dr Julio on Monday and will have a co pay so she does not wish to come to the office tomr. She will keep 04/2025 appointment. She will try to work on diet and exercise.

## 2025-04-04 ENCOUNTER — OFFICE VISIT (OUTPATIENT)
Dept: CARDIOLOGY | Facility: CLINIC | Age: 60
End: 2025-04-04
Payer: COMMERCIAL

## 2025-04-04 VITALS
WEIGHT: 144.4 LBS | HEART RATE: 69 BPM | BODY MASS INDEX: 28.35 KG/M2 | DIASTOLIC BLOOD PRESSURE: 80 MMHG | HEIGHT: 60 IN | RESPIRATION RATE: 16 BRPM | SYSTOLIC BLOOD PRESSURE: 120 MMHG

## 2025-04-04 DIAGNOSIS — E78.00 HYPERCHOLESTEROLEMIA: ICD-10-CM

## 2025-04-04 DIAGNOSIS — I10 ESSENTIAL HYPERTENSION: ICD-10-CM

## 2025-04-04 DIAGNOSIS — R07.9 CHEST PAIN, UNSPECIFIED TYPE: Primary | ICD-10-CM

## 2025-04-04 LAB
ATRIAL RATE: 68
P AXIS: 28
PR INTERVAL: 170
QRS DURATION: 72
QT INTERVAL: 366
QTC CALCULATION(BAZETT): 389
R AXIS: 70
T WAVE AXIS: 47
VENTRICULAR RATE: 68

## 2025-04-04 PROCEDURE — 99215 OFFICE O/P EST HI 40 MIN: CPT | Performed by: INTERNAL MEDICINE

## 2025-04-04 PROCEDURE — 3008F BODY MASS INDEX DOCD: CPT | Performed by: INTERNAL MEDICINE

## 2025-04-04 PROCEDURE — 93000 ELECTROCARDIOGRAM COMPLETE: CPT | Performed by: INTERNAL MEDICINE

## 2025-04-04 RX ORDER — ATORVASTATIN CALCIUM 10 MG/1
10 TABLET, FILM COATED ORAL DAILY
COMMUNITY
Start: 2025-03-04

## 2025-04-04 RX ORDER — LOSARTAN POTASSIUM 25 MG/1
25 TABLET ORAL DAILY
COMMUNITY
Start: 2025-03-04

## 2025-04-04 RX ORDER — METFORMIN HYDROCHLORIDE 500 MG/1
500 TABLET ORAL
COMMUNITY
Start: 2025-03-04

## 2025-04-04 NOTE — ASSESSMENT & PLAN NOTE
Blood work February 2025 shows LDL value 191 mg/dL.  She says she will take the atorvastatin.  She has prescriptions for repeat blood work in June.  I suspect she will need a higher dose of atorvastatin attempted to prepare her and discussed current guidelines.  She does not appear to be having any side effects related to atorvastatin.  She has deferred/refused any additional cardiac testing such as cardiac PET CT scan, calcium scan, or stress testing at this time     She understands the benefit of tobacco cessation but says it is difficult for her to stop smoking.  She wishes to focus on lifestyle modification.

## 2025-04-04 NOTE — LETTER
April 4, 2025     Artemio Julio MD  [address]  [address]  [facility] [place] [address]    Patient: Tammy Walker  YOB: 1965  Date of Visit: 4/4/2025      Dear Dr. Julio:    Thank you for referring Tammy Walker to me for evaluation. Below are my notes for this consultation.    If you have questions, please do not hesitate to call me. I look forward to following your patient along with you.         Sincerely,        Elen Diaz MD        CC: No Recipients    Elen Diaz MD  4/4/2025 11:40 AM  Sign when Signing Visit       Cardiology Outpatient Note    [facility] Office  [address] [place] [address]     [facility]  [facility]  [address] [place] [address]     TEL  [phone]  [facility]     Tammy Walker is a 59 y.o. female with history of hypertension and tobacco use.  She has been on nebivolol 5 mg daily.    She appears to be very manic today and is very upset.  Based on what I can piece together she had blood work with her primary care team which was notable for hemoglobin A1c 6.6.  On this basis she was diagnosed with diabetes.  She was started on losartan 25 mg daily atorvastatin 10 mg daily and metformin 500 mg twice daily which she often only takes once daily or not at all.     She is incredibly upset and does not understand why she needs the medications.  She also says she does not feel well with him though she offers no specific complaints.  I told her it was her decision ultimately what medication she would like to take but I explained the risks and benefits and the rationale.       In the past I have recommended statin therapy because her LDL levels have been elevated.  Have also recommended additional testing for assessment of coronary artery disease or cardiac calcium and patient has deferred/refused.      She is upset about her medications but does not have any chest pain shortness of  "breath or palpitations.                                                         PMH     Medical History:  Past Medical History:   Diagnosis Date    Diabetes (CMS/[facility])     pt states \"  got my records \"    Hypertension     Tobacco abuse     Type 2 diabetes mellitus (CMS/[facility])        Surgical History:  Past Surgical History   Procedure Laterality Date    Breast reconstruction  ,2009    implant     section         Social History:  Social History     Tobacco Use    Smoking status: Every Day     Current packs/day: 0.50     Types: Cigarettes    Smokeless tobacco: Never   Substance Use Topics    Alcohol use: Yes     Comment: socially    Drug use: Defer       Family History: She indicated that her biological mother is .      Allergies:No known allergies    Current Medications:    Outpatient Encounter Medications as of 2025:     losartan (COZAAR) 25 mg tablet, Take 25 mg by mouth daily.    atorvastatin (LIPITOR) 10 mg tablet, Take 10 mg by mouth daily.    metFORMIN (GLUCOPHAGE) 500 mg tablet, Take 500 mg by mouth 2 (two) times a day with meals.    ammonium lactate (LAC-HYDRIN) 12 % lotion,     nebivoloL (BYSTOLIC) 5 mg tablet, Take 1 tablet (5 mg total) by mouth daily.    [DISCONTINUED] mv,iron,mins/folic acid/biotin (HAIR FORMULA ORAL), Take 1 tablet by mouth every other day.    [DISCONTINUED] multivitamin with minerals (HAIR,SKIN AND NAILS ORAL), Take 1 tablet by mouth daily.    [DISCONTINUED] GARLIC ORAL, Take 1 tablet by mouth daily. (Patient not taking: Reported on 2025)    multivitamin with minerals tablet, Take 1 tablet by mouth daily.                                                          OBJECTIVE   ROS as in HPI   Constitution: Negative for chills and fever.   Eyes: Negative for blurred vision and visual disturbance.   Cardiovascular: Negative for chest pain, dyspnea on exertion, near-syncope, palpitations and syncope.   Respiratory: Negative for hemoptysis, " "shortness of breath and wheezing.    Hematologic/Lymphatic: Negative for bleeding problem.   Skin: Negative for rash. No new skin changes  Gastrointestinal: Negative for abdominal pain, diarrhea, hematochezia, melena, nausea and vomiting.   Genitourinary: Negative for dysuria and hematuria.   Neurological: Negative for headaches.          Vitals:    04/04/25 0950   BP: 120/80   BP Location: Left upper arm   Patient Position: Sitting   Pulse: 69   Resp: 16   Weight: 65.5 kg (144 lb 6.4 oz)   Height: 1.524 m (5')       BP Readings from Last 3 Encounters:   04/04/25 120/80   09/18/24 124/70   03/13/24 126/80     Wt Readings from Last 3 Encounters:   04/04/25 65.5 kg (144 lb 6.4 oz)   09/18/24 65.8 kg (145 lb)   03/13/24 66.7 kg (147 lb)       Physical Exam   Constitutional: Appears comfortable in no distress  HEENT:  Neck Supple.  No JVD No carotid bruits no cervical lymphadenopathy  Head: Normocephalic.   Eyes: Extraocular movements intact  Cardiovascular: Normal rate, regular rhythm no S3 gallop Exam reveals no friction rub.    Pulmonary/Chest: Effort normal decreased breath sounds at bases no wheezes.  Abdominal: Soft and nontender.  Musculoskeletal: No lower extremity edema.    Neurological: Alert and oriented to person, place, and time.   Skin: Skin is warm and dry.   Psychiatric: Behavior is normal.            Objective   Lab Results   Component Value Date    CHOL 267 (H) 11/19/2024     Lab Results   Component Value Date    HDL 54 11/19/2024     Lab Results   Component Value Date    LDLCALC 191 (H) 11/19/2024     Lab Results   Component Value Date    TRIG 121 11/19/2024     Lab Results   Component Value Date    ALT 26 11/19/2024    AST 24 11/19/2024     No results found for: \"WBC\", \"HGB\", \"HCT\", \"PLT\", \"INR\"  No results found for: \"GLUCOSE\", \"NA\", \"K\", \"CO2\", \"CL\", \"BUN\", \"CREATININE\"  No results found for: \"HGBA1C\"  No results found for: \"TSH\"  No results found for: \"BNP\"  [unfilled]    Troponin I Results       No " lab values to display.                                                               IMAGING   Transthoracic echocardiogram 1-  Normal-sized LV. Estimated EF 65- 70%. Wall motion appears grossly normal. Normal LV wall thickness. Doppler evaluation does not suggest increased filling pressures.  Normal leaflet structure and normal leaflet motion of the mitral valve.  No significant mitral valve stenosis.  Tricuspid aortic valve. A 0.7 cm faint mobile echodensity is noted in LVOT associated with aortic valve most likely consistent with degenerative material/Lamdls excrescence. Sclerotic aortic valve leaflets. No aortic valve regurgitation.  Trace mitral valve regurgitation.  Aortic root normal. No evidence of aortic coarctation by doppler.  Pulmonic valve not well visualized. Trace pulmonic valve regurgitation.  Tricuspid valve structure is normal. Mild tricuspid valve regurgitation.  Estimated RVSP = 15 mmHg.  Normal-sized RA.  Normal-sized LA. Intact LA septum present.  Normal-sized RV. Normal RV systolic function.  IVC is a small caliber vessel (<1.7cm). IVC collapses >50% during inspiration.  No evidence of pericardial effusion.       Exercise treadmill stress test 10-16-18  Normal stress EKG.  No significant arrhythmias or ischemic changes.  Normal blood pressure response.     EKG 4/4/2025   Normal sinus rhythm 68bpm [id number]  Normal ECG  When compared with ECG of 18-SEP-2024 09:48,  Premature ventricular complexes are no longer Present                                              ASSESSMENT AND PLAN     Ms. Estes is a 59year-old woman with the following issues:  Assessment/Plan    Essential hypertension  Nebivolol 5 mg daily.  Now also on losartan 25 mg daily.    Hypercholesterolemia  Blood work February 2025 shows LDL value 191 mg/dL.  She says she will take the atorvastatin.  She has prescriptions for repeat blood work in June.  I suspect she will need a higher dose of atorvastatin attempted to prepare  her and discussed current guidelines.  She does not appear to be having any side effects related to atorvastatin.  She has deferred/refused any additional cardiac testing such as cardiac PET CT scan, calcium scan, or stress testing at this time     She understands the benefit of tobacco cessation but says it is difficult for her to stop smoking.  She wishes to focus on lifestyle modification.                Return in about 3 months (around 7/4/2025).       Thank you for allowing me to participate in the care of this patient.  I hope this information is helpful.         Elen Diaz MD [facility] [id number]  4/4/2025  11:40 AM    This document was generated utilizing voice recognition technology. A reasonable attempt at proofreading has been made to minimize errors but please excuse any typographical errors which may be present. Please call with any questions.

## 2025-04-04 NOTE — PROGRESS NOTES
"     Cardiology Outpatient Note    [facility]    [address] [place] Office  [address] [place] [address]       [facility]  [address] [place] [address]     TEL  [phone]  [facility]/mlhc     Tammy Walker is a 59 y.o. female with history of hypertension and tobacco use.  She has been on nebivolol 5 mg daily.    She appears to be very manic today and is very upset.  Based on what I can piece together she had blood work with her primary care team which was notable for hemoglobin A1c 6.6.  On this basis she was diagnosed with diabetes.  She was started on losartan 25 mg daily atorvastatin 10 mg daily and metformin 500 mg twice daily which she often only takes once daily or not at all.     She is incredibly upset and does not understand why she needs the medications.  She also says she does not feel well with him though she offers no specific complaints.  I told her it was her decision ultimately what medication she would like to take but I explained the risks and benefits and the rationale.       In the past I have recommended statin therapy because her LDL levels have been elevated.  Have also recommended additional testing for assessment of coronary artery disease or cardiac calcium and patient has deferred/refused.      She is upset about her medications but does not have any chest pain shortness of breath or palpitations.                                                         PMH     Medical History:  Past Medical History:   Diagnosis Date    Diabetes (CMS/[facility])     pt states \"  got my records \"    Hypertension     Tobacco abuse     Type 2 diabetes mellitus (CMS/[facility])        Surgical History:  Past Surgical History   Procedure Laterality Date    Breast reconstruction  ,    implant     section         Social History:  Social History     Tobacco Use    Smoking status: Every Day     Current packs/day: 0.50 "     Types: Cigarettes    Smokeless tobacco: Never   Substance Use Topics    Alcohol use: Yes     Comment: socially    Drug use: Defer       Family History: She indicated that her biological mother is .      Allergies:No known allergies    Current Medications:    Outpatient Encounter Medications as of 2025:     losartan (COZAAR) 25 mg tablet, Take 25 mg by mouth daily.    atorvastatin (LIPITOR) 10 mg tablet, Take 10 mg by mouth daily.    metFORMIN (GLUCOPHAGE) 500 mg tablet, Take 500 mg by mouth 2 (two) times a day with meals.    ammonium lactate (LAC-HYDRIN) 12 % lotion,     nebivoloL (BYSTOLIC) 5 mg tablet, Take 1 tablet (5 mg total) by mouth daily.    [DISCONTINUED] mv,iron,mins/folic acid/biotin (HAIR FORMULA ORAL), Take 1 tablet by mouth every other day.    [DISCONTINUED] multivitamin with minerals (HAIR,SKIN AND NAILS ORAL), Take 1 tablet by mouth daily.    [DISCONTINUED] GARLIC ORAL, Take 1 tablet by mouth daily. (Patient not taking: Reported on 2025)    multivitamin with minerals tablet, Take 1 tablet by mouth daily.                                                          OBJECTIVE   ROS as in HPI   Constitution: Negative for chills and fever.   Eyes: Negative for blurred vision and visual disturbance.   Cardiovascular: Negative for chest pain, dyspnea on exertion, near-syncope, palpitations and syncope.   Respiratory: Negative for hemoptysis, shortness of breath and wheezing.    Hematologic/Lymphatic: Negative for bleeding problem.   Skin: Negative for rash. No new skin changes  Gastrointestinal: Negative for abdominal pain, diarrhea, hematochezia, melena, nausea and vomiting.   Genitourinary: Negative for dysuria and hematuria.   Neurological: Negative for headaches.          Vitals:    25 0950   BP: 120/80   BP Location: Left upper arm   Patient Position: Sitting   Pulse: 69   Resp: 16   Weight: 65.5 kg (144 lb 6.4 oz)   Height: 1.524 m (5')       BP Readings from Last 3 Encounters:  "  04/04/25 120/80   09/18/24 124/70   03/13/24 126/80     Wt Readings from Last 3 Encounters:   04/04/25 65.5 kg (144 lb 6.4 oz)   09/18/24 65.8 kg (145 lb)   03/13/24 66.7 kg (147 lb)       Physical Exam   Constitutional: Appears comfortable in no distress  HEENT:  Neck Supple.  No JVD No carotid bruits no cervical lymphadenopathy  Head: Normocephalic.   Eyes: Extraocular movements intact  Cardiovascular: Normal rate, regular rhythm no S3 gallop Exam reveals no friction rub.    Pulmonary/Chest: Effort normal decreased breath sounds at bases no wheezes.  Abdominal: Soft and nontender.  Musculoskeletal: No lower extremity edema.    Neurological: Alert and oriented to person, place, and time.   Skin: Skin is warm and dry.   Psychiatric: Behavior is normal.            Objective   Lab Results   Component Value Date    CHOL 267 (H) 11/19/2024     Lab Results   Component Value Date    HDL 54 11/19/2024     Lab Results   Component Value Date    LDLCALC 191 (H) 11/19/2024     Lab Results   Component Value Date    TRIG 121 11/19/2024     Lab Results   Component Value Date    ALT 26 11/19/2024    AST 24 11/19/2024     No results found for: \"WBC\", \"HGB\", \"HCT\", \"PLT\", \"INR\"  No results found for: \"GLUCOSE\", \"NA\", \"K\", \"CO2\", \"CL\", \"BUN\", \"CREATININE\"  No results found for: \"HGBA1C\"  No results found for: \"TSH\"  No results found for: \"BNP\"  [unfilled]    Troponin I Results       No lab values to display.                                                               IMAGING   Transthoracic echocardiogram 1-  Normal-sized LV. Estimated EF 65- 70%. Wall motion appears grossly normal. Normal LV wall thickness. Doppler evaluation does not suggest increased filling pressures.  Normal leaflet structure and normal leaflet motion of the mitral valve.  No significant mitral valve stenosis.  Tricuspid aortic valve. A 0.7 cm faint mobile echodensity is noted in LVOT associated with aortic valve most likely consistent with degenerative " material/Lamdls excrescence. Sclerotic aortic valve leaflets. No aortic valve regurgitation.  Trace mitral valve regurgitation.  Aortic root normal. No evidence of aortic coarctation by doppler.  Pulmonic valve not well visualized. Trace pulmonic valve regurgitation.  Tricuspid valve structure is normal. Mild tricuspid valve regurgitation.  Estimated RVSP = 15 mmHg.  Normal-sized RA.  Normal-sized LA. Intact LA septum present.  Normal-sized RV. Normal RV systolic function.  IVC is a small caliber vessel (<1.7cm). IVC collapses >50% during inspiration.  No evidence of pericardial effusion.       Exercise treadmill stress test 10-16-18  Normal stress EKG.  No significant arrhythmias or ischemic changes.  Normal blood pressure response.     EKG 4/4/2025   Normal sinus rhythm 68bpm [id number]  Normal ECG  When compared with ECG of 18-SEP-2024 09:48,  Premature ventricular complexes are no longer Present                                              ASSESSMENT AND PLAN     Ms. Estes is a 59year-old woman with the following issues:  Assessment/Plan    Essential hypertension  Nebivolol 5 mg daily.  Now also on losartan 25 mg daily.    Hypercholesterolemia  Blood work February 2025 shows LDL value 191 mg/dL.  She says she will take the atorvastatin.  She has prescriptions for repeat blood work in June.  I suspect she will need a higher dose of atorvastatin attempted to prepare her and discussed current guidelines.  She does not appear to be having any side effects related to atorvastatin.  She has deferred/refused any additional cardiac testing such as cardiac PET CT scan, calcium scan, or stress testing at this time     She understands the benefit of tobacco cessation but says it is difficult for her to stop smoking.  She wishes to focus on lifestyle modification.                Return in about 3 months (around 7/4/2025).       Thank you for allowing me to participate in the care of this patient.  I hope this information is  helpful.         Elen Diaz MD [facility]  4/4/2025  11:40 AM    This document was generated utilizing voice recognition technology. A reasonable attempt at proofreading has been made to minimize errors but please excuse any typographical errors which may be present. Please call with any questions.

## 2025-06-05 DIAGNOSIS — I10 ESSENTIAL HYPERTENSION: ICD-10-CM

## 2025-06-05 RX ORDER — NEBIVOLOL 5 MG/1
5 TABLET ORAL DAILY
Qty: 90 TABLET | Refills: 3 | Status: SHIPPED | OUTPATIENT
Start: 2025-06-05